# Patient Record
Sex: FEMALE | Race: WHITE | Employment: OTHER | URBAN - METROPOLITAN AREA
[De-identification: names, ages, dates, MRNs, and addresses within clinical notes are randomized per-mention and may not be internally consistent; named-entity substitution may affect disease eponyms.]

---

## 2017-11-27 ENCOUNTER — HOSPITAL ENCOUNTER (OUTPATIENT)
Dept: MAMMOGRAPHY | Age: 62
Discharge: HOME | End: 2017-11-27
Attending: OBSTETRICS & GYNECOLOGY
Payer: COMMERCIAL

## 2017-11-27 DIAGNOSIS — Z12.31 ENCOUNTER FOR SCREENING MAMMOGRAM FOR MALIGNANT NEOPLASM OF BREAST: ICD-10-CM

## 2017-11-27 PROCEDURE — 77063 BREAST TOMOSYNTHESIS BI: CPT | Performed by: OBSTETRICS & GYNECOLOGY

## 2017-11-27 PROCEDURE — 77067 SCR MAMMO BI INCL CAD: CPT | Performed by: OBSTETRICS & GYNECOLOGY

## 2018-08-24 ENCOUNTER — HOSPITAL ENCOUNTER (OUTPATIENT)
Dept: CV DIAGNOSTICS | Facility: HOSPITAL | Age: 63
Discharge: HOME | End: 2018-08-24
Attending: INTERNAL MEDICINE
Payer: COMMERCIAL

## 2018-08-24 ENCOUNTER — ANESTHESIA EVENT (OUTPATIENT)
Dept: SURGERY | Facility: HOSPITAL | Age: 63
End: 2018-08-24
Payer: COMMERCIAL

## 2018-08-24 DIAGNOSIS — R94.31 ABNORMAL EKG: ICD-10-CM

## 2018-08-24 PROCEDURE — 93017 CV STRESS TEST TRACING ONLY: CPT | Performed by: INTERNAL MEDICINE

## 2018-08-24 PROCEDURE — 93018 CV STRESS TEST I&R ONLY: CPT | Performed by: INTERNAL MEDICINE

## 2018-08-24 NOTE — PROGRESS NOTES
CARDIAC DIAGNOSTICS PRELIMINARY REPORT    No ST changes noted before, during, or after exercise. Rest: heart rate was 99 BPM, blood pressure was 128/82 mmHg, EKG showed NSR, LAD, LAFB, PRWP. Patient exercised for 6:01 minutes on a Francisco protocol.  Juventino Zendejas

## 2018-08-25 ENCOUNTER — ANESTHESIA (OUTPATIENT)
Dept: SURGERY | Facility: HOSPITAL | Age: 63
End: 2018-08-25
Payer: COMMERCIAL

## 2018-08-25 ENCOUNTER — HOSPITAL ENCOUNTER (OUTPATIENT)
Facility: HOSPITAL | Age: 63
Setting detail: HOSPITAL OUTPATIENT SURGERY
Discharge: HOME | End: 2018-08-25
Attending: DENTIST | Admitting: DENTIST
Payer: COMMERCIAL

## 2018-08-25 VITALS
SYSTOLIC BLOOD PRESSURE: 131 MMHG | DIASTOLIC BLOOD PRESSURE: 77 MMHG | HEIGHT: 67 IN | OXYGEN SATURATION: 96 % | WEIGHT: 260.13 LBS | RESPIRATION RATE: 17 BRPM | HEART RATE: 93 BPM | BODY MASS INDEX: 40.83 KG/M2 | TEMPERATURE: 98 F

## 2018-08-25 LAB
GLUCOSE BLD-MCNC: 116 MG/DL (ref 65–99)
GLUCOSE BLD-MCNC: 156 MG/DL (ref 65–99)
GLUCOSE BLD-MCNC: 156 MG/DL (ref 65–99)

## 2018-08-25 PROCEDURE — 0CQWXZ1 REPAIR OF UPPER TOOTH, MULTIPLE, EXTERNAL APPROACH: ICD-10-PCS | Performed by: DENTIST

## 2018-08-25 PROCEDURE — 0CDXXZ1 EXTRACTION OF LOWER TOOTH, MULTIPLE, EXTERNAL APPROACH: ICD-10-PCS | Performed by: DENTIST

## 2018-08-25 PROCEDURE — 82962 GLUCOSE BLOOD TEST: CPT

## 2018-08-25 PROCEDURE — 0CDWXZ1 EXTRACTION OF UPPER TOOTH, MULTIPLE, EXTERNAL APPROACH: ICD-10-PCS | Performed by: DENTIST

## 2018-08-25 PROCEDURE — 0CQXXZ1 REPAIR OF LOWER TOOTH, MULTIPLE, EXTERNAL APPROACH: ICD-10-PCS | Performed by: DENTIST

## 2018-08-25 DEVICE — IMPLANTABLE DEVICE: Type: IMPLANTABLE DEVICE | Site: MOUTH | Status: FUNCTIONAL

## 2018-08-25 RX ORDER — SODIUM CHLORIDE, SODIUM LACTATE, POTASSIUM CHLORIDE, CALCIUM CHLORIDE 600; 310; 30; 20 MG/100ML; MG/100ML; MG/100ML; MG/100ML
INJECTION, SOLUTION INTRAVENOUS CONTINUOUS
Status: DISCONTINUED | OUTPATIENT
Start: 2018-08-25 | End: 2018-08-25

## 2018-08-25 RX ORDER — METOCLOPRAMIDE HYDROCHLORIDE 5 MG/ML
10 INJECTION INTRAMUSCULAR; INTRAVENOUS AS NEEDED
Status: DISCONTINUED | OUTPATIENT
Start: 2018-08-25 | End: 2018-08-25

## 2018-08-25 RX ORDER — SCOLOPAMINE TRANSDERMAL SYSTEM 1 MG/1
1 PATCH, EXTENDED RELEASE TRANSDERMAL ONCE
Status: DISCONTINUED | OUTPATIENT
Start: 2018-08-25 | End: 2018-08-25

## 2018-08-25 RX ORDER — DEXTROSE MONOHYDRATE 25 G/50ML
50 INJECTION, SOLUTION INTRAVENOUS
Status: DISCONTINUED | OUTPATIENT
Start: 2018-08-25 | End: 2018-08-25

## 2018-08-25 RX ORDER — MIDAZOLAM HYDROCHLORIDE 1 MG/ML
1 INJECTION INTRAMUSCULAR; INTRAVENOUS EVERY 5 MIN PRN
Status: DISCONTINUED | OUTPATIENT
Start: 2018-08-25 | End: 2018-08-25

## 2018-08-25 RX ORDER — NALOXONE HYDROCHLORIDE 0.4 MG/ML
80 INJECTION, SOLUTION INTRAMUSCULAR; INTRAVENOUS; SUBCUTANEOUS AS NEEDED
Status: DISCONTINUED | OUTPATIENT
Start: 2018-08-25 | End: 2018-08-25

## 2018-08-25 RX ORDER — INSULIN ASPART 100 [IU]/ML
INJECTION, SOLUTION INTRAVENOUS; SUBCUTANEOUS ONCE
Status: DISCONTINUED | OUTPATIENT
Start: 2018-08-25 | End: 2018-08-25

## 2018-08-25 RX ORDER — ACETAMINOPHEN 500 MG
1000 TABLET ORAL ONCE
COMMUNITY

## 2018-08-25 RX ORDER — ACETAMINOPHEN 500 MG
1000 TABLET ORAL ONCE
Status: DISCONTINUED | OUTPATIENT
Start: 2018-08-25 | End: 2018-08-25 | Stop reason: HOSPADM

## 2018-08-25 RX ORDER — HYDROCODONE BITARTRATE AND ACETAMINOPHEN 10; 325 MG/1; MG/1
1 TABLET ORAL AS NEEDED
Status: DISCONTINUED | OUTPATIENT
Start: 2018-08-25 | End: 2018-08-25

## 2018-08-25 RX ORDER — MORPHINE SULFATE 4 MG/ML
2 INJECTION, SOLUTION INTRAMUSCULAR; INTRAVENOUS EVERY 5 MIN PRN
Status: DISCONTINUED | OUTPATIENT
Start: 2018-08-25 | End: 2018-08-25

## 2018-08-25 RX ORDER — MEPERIDINE HYDROCHLORIDE 25 MG/ML
12.5 INJECTION INTRAMUSCULAR; INTRAVENOUS; SUBCUTANEOUS AS NEEDED
Status: DISCONTINUED | OUTPATIENT
Start: 2018-08-25 | End: 2018-08-25

## 2018-08-25 RX ORDER — MAGNESIUM HYDROXIDE 1200 MG/15ML
LIQUID ORAL CONTINUOUS PRN
Status: DISCONTINUED | OUTPATIENT
Start: 2018-08-25 | End: 2018-08-25

## 2018-08-25 RX ORDER — BUPIVACAINE HYDROCHLORIDE AND EPINEPHRINE 5; 5 MG/ML; UG/ML
INJECTION, SOLUTION EPIDURAL; INTRACAUDAL; PERINEURAL AS NEEDED
Status: DISCONTINUED | OUTPATIENT
Start: 2018-08-25 | End: 2018-08-25 | Stop reason: HOSPADM

## 2018-08-25 RX ORDER — SCOLOPAMINE TRANSDERMAL SYSTEM 1 MG/1
PATCH, EXTENDED RELEASE TRANSDERMAL
Status: DISCONTINUED
Start: 2018-08-25 | End: 2018-08-25

## 2018-08-25 RX ORDER — HYDROCODONE BITARTRATE AND ACETAMINOPHEN 10; 325 MG/1; MG/1
2 TABLET ORAL AS NEEDED
Status: DISCONTINUED | OUTPATIENT
Start: 2018-08-25 | End: 2018-08-25

## 2018-08-25 RX ORDER — DEXTROSE MONOHYDRATE 25 G/50ML
50 INJECTION, SOLUTION INTRAVENOUS
Status: DISCONTINUED | OUTPATIENT
Start: 2018-08-25 | End: 2018-08-25 | Stop reason: HOSPADM

## 2018-08-25 RX ORDER — ONDANSETRON 2 MG/ML
4 INJECTION INTRAMUSCULAR; INTRAVENOUS AS NEEDED
Status: DISCONTINUED | OUTPATIENT
Start: 2018-08-25 | End: 2018-08-25

## 2018-08-25 NOTE — OPERATIVE REPORT
Teeth #1,3,4,12,13,14,29,31,32,were excised & Ridge Preservation Grafts were done to Surgical Sights #3,4,12,13,14,29,31,

## 2018-08-25 NOTE — ANESTHESIA PREPROCEDURE EVALUATION
PRE-OP EVALUATION    Patient Name: Kosta Cornea    Pre-op Diagnosis: TERMINAL SLEEP APNEA  UNRESTORABLE TEETH     Procedure(s):  EXTRACT TEETH 1, 32,12,13, 3, 14, 29, 31. BONE GRAFT 3, 13, 14, 29, 31, 32.   BONE GRAFT 12, 3, 13, 14, 29, 31, 32 WITH the mouth or throat. Disp:  Rfl:    Cholecalciferol (VITAMIN D OR) Take  by mouth. Disp:  Rfl:    LUTEIN OR by Does not apply route. Disp:  Rfl:    MetFORMIN HCl 1000 MG Oral Tab Take 1,000 mg by mouth 2 (two) times daily with meals.  Disp:  Rfl:    Moe Franklin

## 2018-08-25 NOTE — ANESTHESIA POSTPROCEDURE EVALUATION
[address] [facility] [place] Patient Status:  Hospital Outpatient Surgery   Age/Gender 58year old female MRN [id number]   [facility] SURGERY Attending Deonte Nevarez 148 Day # 0 PCP Aneudy Ni MD       Anesthesia Post

## 2018-08-25 NOTE — OPERATIVE REPORT
[facility]    PATIENT'S NAME: Dwayne Tatum   ATTENDING PHYSICIAN: Ashley De Leon D.D.S. OPERATING PHYSICIAN: Ashley De Leon D.D.S.    PATIENT ACCOUNT#:   [de-identified]    [facility]  MEDICAL RECORD #:   [id number]       DATE OF B 3 teeth were then surgically removed in the standard fashion. The surgical sites were irrigated, suctioned, debrided. Gel-Foam was placed in the tooth #32 area.   Following this, after adequate debridement of tooth numbers sites 29 and 31, ridge preservat Peroxide and saline 50% was utilized to cleanse the oral cavity. Oropharynx was suctioned. An orogastric tube was placed to evacuate the stomach contents.   A periapical occlusive dressing was then utilized over the mandibular right posterior, maxillary r

## 2018-11-28 ENCOUNTER — HOSPITAL ENCOUNTER (OUTPATIENT)
Dept: MAMMOGRAPHY | Age: 63
Discharge: HOME | End: 2018-11-28
Attending: OBSTETRICS & GYNECOLOGY
Payer: COMMERCIAL

## 2018-11-28 DIAGNOSIS — Z12.31 ENCOUNTER FOR SCREENING MAMMOGRAM FOR MALIGNANT NEOPLASM OF BREAST: ICD-10-CM

## 2018-11-28 PROCEDURE — 77067 SCR MAMMO BI INCL CAD: CPT | Performed by: OBSTETRICS & GYNECOLOGY

## 2018-11-28 PROCEDURE — 77063 BREAST TOMOSYNTHESIS BI: CPT | Performed by: OBSTETRICS & GYNECOLOGY

## 2021-05-27 ENCOUNTER — ORDER TRANSCRIPTION (OUTPATIENT)
Dept: ADMINISTRATIVE | Facility: HOSPITAL | Age: 66
End: 2021-05-27

## 2021-05-27 DIAGNOSIS — Z12.31 ENCOUNTER FOR SCREENING MAMMOGRAM FOR MALIGNANT NEOPLASM OF BREAST: Primary | ICD-10-CM

## 2021-08-07 ENCOUNTER — HOSPITAL ENCOUNTER (OUTPATIENT)
Dept: MAMMOGRAPHY | Age: 66
Discharge: HOME | End: 2021-08-07
Attending: FAMILY MEDICINE
Payer: MEDICARE

## 2021-08-07 DIAGNOSIS — Z12.31 ENCOUNTER FOR SCREENING MAMMOGRAM FOR MALIGNANT NEOPLASM OF BREAST: ICD-10-CM

## 2021-08-07 PROCEDURE — 77063 BREAST TOMOSYNTHESIS BI: CPT | Performed by: FAMILY MEDICINE

## 2021-08-07 PROCEDURE — 77067 SCR MAMMO BI INCL CAD: CPT | Performed by: FAMILY MEDICINE

## 2021-12-12 ENCOUNTER — NURSE ONLY (OUTPATIENT)
Dept: LAB | Age: 66
End: 2021-12-12
Attending: FAMILY MEDICINE
Payer: MEDICARE

## 2021-12-12 DIAGNOSIS — Z11.59 SCREENING EXAMINATION FOR POLIOMYELITIS: Primary | ICD-10-CM

## 2021-12-12 DIAGNOSIS — E56.9 VITAMIN DISEASE: ICD-10-CM

## 2021-12-12 PROCEDURE — 82306 VITAMIN D 25 HYDROXY: CPT

## 2021-12-12 PROCEDURE — 85025 COMPLETE CBC W/AUTO DIFF WBC: CPT

## 2021-12-12 PROCEDURE — 80053 COMPREHEN METABOLIC PANEL: CPT

## 2021-12-12 PROCEDURE — 83735 ASSAY OF MAGNESIUM: CPT

## 2021-12-14 ENCOUNTER — NURSE ONLY (OUTPATIENT)
Dept: LAB | Age: 66
End: 2021-12-14
Attending: FAMILY MEDICINE
Payer: MEDICARE

## 2021-12-14 DIAGNOSIS — D32.0 BENIGN NEOPLASM OF CEREBRAL MENINGES (HCC): Primary | ICD-10-CM

## 2021-12-14 PROCEDURE — 85025 COMPLETE CBC W/AUTO DIFF WBC: CPT

## 2021-12-14 PROCEDURE — 85027 COMPLETE CBC AUTOMATED: CPT

## 2021-12-14 PROCEDURE — 85007 BL SMEAR W/DIFF WBC COUNT: CPT

## 2021-12-14 PROCEDURE — 80053 COMPREHEN METABOLIC PANEL: CPT

## 2021-12-19 ENCOUNTER — NURSE ONLY (OUTPATIENT)
Dept: LAB | Age: 66
End: 2021-12-19
Attending: FAMILY MEDICINE
Payer: MEDICARE

## 2021-12-19 DIAGNOSIS — Z11.52 ENCOUNTER FOR SCREENING FOR SEVERE ACUTE RESPIRATORY SYNDROME CORONAVIRUS 2 (SARS-COV-2) INFECTION: Primary | ICD-10-CM

## 2024-02-16 ENCOUNTER — OFFICE VISIT (OUTPATIENT)
Dept: CARDIOLOGY | Age: 69
End: 2024-02-16

## 2024-02-16 ENCOUNTER — TELEPHONE (OUTPATIENT)
Dept: CARDIOLOGY | Age: 69
End: 2024-02-16

## 2024-02-16 VITALS
SYSTOLIC BLOOD PRESSURE: 138 MMHG | BODY MASS INDEX: 37.2 KG/M2 | HEART RATE: 64 BPM | WEIGHT: 237 LBS | DIASTOLIC BLOOD PRESSURE: 72 MMHG | HEIGHT: 67 IN

## 2024-02-16 DIAGNOSIS — E78.5 DYSLIPIDEMIA: Primary | ICD-10-CM

## 2024-02-16 DIAGNOSIS — D32.0 MENINGIOMA, CEREBRAL (CMD): ICD-10-CM

## 2024-02-16 DIAGNOSIS — Z01.810 PREOPERATIVE CARDIOVASCULAR EXAMINATION: ICD-10-CM

## 2024-02-16 DIAGNOSIS — I10 ESSENTIAL HYPERTENSION: ICD-10-CM

## 2024-02-16 DIAGNOSIS — E11.42 TYPE 2 DIABETES MELLITUS WITH DIABETIC POLYNEUROPATHY, WITHOUT LONG-TERM CURRENT USE OF INSULIN (CMD): ICD-10-CM

## 2024-02-16 DIAGNOSIS — G47.33 OSA (OBSTRUCTIVE SLEEP APNEA): ICD-10-CM

## 2024-02-16 RX ORDER — ROSUVASTATIN CALCIUM 20 MG/1
20 TABLET, COATED ORAL DAILY
COMMUNITY

## 2024-02-16 RX ORDER — GINSENG 100 MG
50 CAPSULE ORAL DAILY
COMMUNITY

## 2024-02-16 RX ORDER — ZOLPIDEM TARTRATE 10 MG/1
10 TABLET ORAL AT BEDTIME
COMMUNITY

## 2024-02-16 RX ORDER — LEVOTHYROXINE SODIUM 88 UG/1
88 TABLET ORAL DAILY
COMMUNITY

## 2024-02-16 RX ORDER — RAMIPRIL 10 MG/1
10 CAPSULE ORAL DAILY
COMMUNITY

## 2024-02-16 ASSESSMENT — PATIENT HEALTH QUESTIONNAIRE - PHQ9
1. LITTLE INTEREST OR PLEASURE IN DOING THINGS: NOT AT ALL
SUM OF ALL RESPONSES TO PHQ9 QUESTIONS 1 AND 2: 0
CLINICAL INTERPRETATION OF PHQ2 SCORE: NO FURTHER SCREENING NEEDED
SUM OF ALL RESPONSES TO PHQ9 QUESTIONS 1 AND 2: 0
2. FEELING DOWN, DEPRESSED OR HOPELESS: NOT AT ALL

## 2024-03-01 PROBLEM — G47.33 OSA (OBSTRUCTIVE SLEEP APNEA): Status: ACTIVE | Noted: 2018-02-21

## 2024-03-01 PROBLEM — E78.5 DYSLIPIDEMIA: Status: ACTIVE | Noted: 2017-09-21

## 2024-03-01 PROBLEM — I10 ESSENTIAL HYPERTENSION: Status: ACTIVE | Noted: 2017-09-21

## 2024-03-01 PROBLEM — E55.9 VITAMIN D DEFICIENCY: Status: ACTIVE | Noted: 2019-01-14

## 2024-03-01 PROBLEM — I25.10 MILD CORONARY ARTERY DISEASE: Status: ACTIVE | Noted: 2019-01-14

## 2024-03-01 PROBLEM — R47.01 APHASIA: Status: ACTIVE | Noted: 2021-12-09

## 2024-03-01 PROBLEM — D32.0 MENINGIOMA, CEREBRAL (CMD): Status: ACTIVE | Noted: 2024-03-01

## 2024-03-01 PROBLEM — Z01.810 PREOPERATIVE CARDIOVASCULAR EXAMINATION: Status: ACTIVE | Noted: 2024-03-01

## 2024-03-01 PROBLEM — E03.9 ACQUIRED HYPOTHYROIDISM: Status: ACTIVE | Noted: 2018-12-31

## 2024-07-15 ENCOUNTER — ORDER TRANSCRIPTION (OUTPATIENT)
Dept: ADMINISTRATIVE | Facility: HOSPITAL | Age: 69
End: 2024-07-15

## 2024-07-15 DIAGNOSIS — Z12.31 ENCOUNTER FOR SCREENING MAMMOGRAM FOR MALIGNANT NEOPLASM OF BREAST: Primary | ICD-10-CM

## 2024-07-20 ENCOUNTER — HOSPITAL ENCOUNTER (OUTPATIENT)
Dept: MAMMOGRAPHY | Age: 69
Discharge: HOME | End: 2024-07-20
Attending: FAMILY MEDICINE
Payer: MEDICARE

## 2024-07-20 DIAGNOSIS — Z12.31 ENCOUNTER FOR SCREENING MAMMOGRAM FOR MALIGNANT NEOPLASM OF BREAST: ICD-10-CM

## 2024-07-20 PROCEDURE — 77063 BREAST TOMOSYNTHESIS BI: CPT | Performed by: FAMILY MEDICINE

## 2024-07-20 PROCEDURE — 77067 SCR MAMMO BI INCL CAD: CPT | Performed by: FAMILY MEDICINE

## 2024-08-15 ENCOUNTER — HOSPITAL ENCOUNTER (OUTPATIENT)
Dept: MAMMOGRAPHY | Facility: HOSPITAL | Age: 69
Discharge: HOME | End: 2024-08-15
Attending: FAMILY MEDICINE
Payer: MEDICARE

## 2024-08-15 DIAGNOSIS — R92.2 INCONCLUSIVE MAMMOGRAM: ICD-10-CM

## 2024-08-15 PROCEDURE — 77061 BREAST TOMOSYNTHESIS UNI: CPT | Performed by: FAMILY MEDICINE

## 2024-08-15 PROCEDURE — 76642 ULTRASOUND BREAST LIMITED: CPT | Performed by: FAMILY MEDICINE

## 2024-08-15 PROCEDURE — 77065 DX MAMMO INCL CAD UNI: CPT | Performed by: FAMILY MEDICINE

## 2024-08-15 NOTE — IMAGING NOTE
Joanie Ramos is recommended for a stereotactic biopsy of the right breast by     History   Findings  Recommendation  See EMR for complete imaging report    Medications and allergies reviewed:  Current Outpatient Medications   Medication Sig Dispense Refill    acetaminophen 500 MG Oral Tab Take 1,000 mg by mouth one time.      docusate sodium 100 MG Oral Cap Take 100 mg by mouth 2 (two) times daily.      Liraglutide -Weight Management (SAXENDA) 18 MG/3ML Subcutaneous Solution Pen-injector Inject into the skin daily.        NON FORMULARY 2 (two) times daily.        Magnesium 100 MG Oral Tab Take by mouth.      Zolpidem Tartrate (AMBIEN) 5 MG Oral Tab Take 5 mg by mouth nightly.        Multiple Vitamins-Minerals (CENTRUM SILVER OR) Take  by mouth.      Multiple Vitamins-Minerals (ZINC OR) Use as directed  in the mouth or throat.      Cholecalciferol (VITAMIN D OR) Take  by mouth.      LUTEIN OR by Does not apply route.      MetFORMIN HCl 1000 MG Oral Tab Take 1,000 mg by mouth 2 (two) times daily with meals.      Ramipril 10 MG Oral Cap Take 10 mg by mouth daily.      Rosuvastatin Calcium (CRESTOR) 10 MG Oral Tab Take 20 mg by mouth every other day.       The following allergies were reported-  ErythromycinHIVES  CodeineOTHER (SEE COMMENTS)    Joanie Ramos denies the use of prescribed anticoagulants, denies known bleeding disorders and/or liver disease, denies chemotherapy    Procedure explained and questions answered.   Emotional and educational support provided. Joanie Ramos provided with written educational material.     Ms. Ramos instructed to take 1000 mg of acetaminophen on the day of the biopsy, eat a light meal, and bring or wear a sport bra.  Post biopsy care and instruction reviewed: including no lifting more than five pounds, no upper body exercise, icing of biopsy site, no submerging in water.  Joanie Ramos verbalized understanding.    MsBebo Rachel informed that the [facility] Center schedulers  would be contacting her once the biopsy order is received to schedule an appointment.

## 2024-08-28 ENCOUNTER — TELEPHONE (OUTPATIENT)
Dept: MAMMOGRAPHY | Facility: HOSPITAL | Age: 69
End: 2024-08-28

## 2024-08-28 NOTE — TELEPHONE ENCOUNTER
Follow up call to patient re: recommended breast biopsy. Patient's physician would like patient to see a breast surgeon prior to writing an order for a biopsy. Patient states that she has not yet been given the surgeon's name and is hoping to get an appt with her PCP tomorrow to discuss. Additional questions answered re: breast biopsy and patient was provided with the contact information for the [facility] breast surgeons. Patient was given this RN's contact information and will call us back with an update or any further questions.

## 2024-09-06 ENCOUNTER — OFFICE VISIT (OUTPATIENT)
Dept: SURGERY | Facility: CLINIC | Age: 69
End: 2024-09-06
Payer: MEDICARE

## 2024-09-06 VITALS
WEIGHT: 261.88 LBS | HEIGHT: 67 IN | RESPIRATION RATE: 18 BRPM | SYSTOLIC BLOOD PRESSURE: 141 MMHG | DIASTOLIC BLOOD PRESSURE: 72 MMHG | TEMPERATURE: 98 F | HEART RATE: 71 BPM | OXYGEN SATURATION: 96 % | BODY MASS INDEX: 41.1 KG/M2

## 2024-09-06 DIAGNOSIS — R92.1 BREAST CALCIFICATION, RIGHT: Primary | ICD-10-CM

## 2024-09-06 PROCEDURE — 99204 OFFICE O/P NEW MOD 45 MIN: CPT | Performed by: SURGERY

## 2024-09-06 RX ORDER — LEVOTHYROXINE SODIUM 100 UG/1
100 TABLET ORAL
COMMUNITY

## 2024-09-06 RX ORDER — SAW/PYGEUM/BETA/HERB/D3/B6/ZN 30 MG-25MG
1 CAPSULE ORAL DAILY
COMMUNITY

## 2024-09-06 NOTE — PROGRESS NOTES
Breast Surgery New Patient Consultation    This is the first visit for this 69 year old woman, referred by Dr. Abel, who presents for evaluation of right breast calcifications.    History of Present Illness:   Ms. Joanie Ramos is a 69 year old woman who presents with imaging detected right breast calcifications.  The patient denies any palpable masses, nipple discharge, skin changes or axillary symptoms.  She has no personal prior history of breast disease or biopsies.  She does not have any known family history of breast cancer.  She had a screening mammogram on 2024 and was found to have new calcifications in the right breast.  She had a diagnostic evaluation on 2024 that showed indeterminate calcifications and a 2 site biopsy was recommended but has not yet been performed.  She is here today for evaluation and recommendations for further therapy.        Past Medical History:    Hypothyroid    Osteoarthritis    Sleep apnea    CPAP    Type II or unspecified type diabetes mellitus without mention of complication, not stated as uncontrolled    Unspecified essential hypertension       Past Surgical History:   Procedure Laterality Date    Appendectomy      Colonoscopy      Repair of rectocele      mesh    Total abdom hysterectomy         Gynecological History:  Pt is a   Pt was 27 years old at time of first pregnancy.    She has cumulative breastfeeding history of 1 months.  She achieved menarche at age 11 and LMP Patient's last menstrual period was 2012.  Age of Menopause: 49  Type: natural  She denies any history of hormone replacement therapy f .  She has history of oral contraceptive use for 1 years, last in .  She denies infertility treatment to achieve pregnancy.    Medications:    No outpatient medications have been marked as taking for the 24 encounter (Appointment) with Luanne Barnett MD.       Allergies:    Allergies   Allergen Reactions    Erythromycin HIVES     Codeine OTHER (SEE COMMENTS)     Drowsy/respiratory rate/O2 levels decrease       Family History:   No family history on file.    She is not of Ashkenazi Sikh ancestry.    Social History:  History   Alcohol Use No       History   Smoking Status    Never   Smokeless Tobacco    Never   Ms. Joanie Ramos is  with 1 children. She has 2 siblings. She is currently Retired    Review of Systems:  General:   The patient denies, fever, chills, +night sweats, fatigue, generalized weakness, change in appetite or weight loss.    HEENT:     The patient denies eye irritation, cataracts, redness, glaucoma, yellowing of the eyes, change in vision, color blindness, or +wearing contacts/glasses. The patient denies hearing loss, ringing in the ears, ear drainage, +earaches, +nasal congestion, nose bleeds, +snoring, pain in mouth/throat, hoarseness, change in voice, facial trauma.    Respiratory:  The patient denies chronic cough, phlegm, hemoptysis, pleurisy/chest pain, pneumonia, asthma, wheezing, difficulty in breathing with exertion, emphysema, chronic bronchitis, shortness of breath or abnormal sound when breathing.     Cardiovascular:  There is no history of chest pain, chest pressure/discomfort, palpitations, irregular heartbeat, fainting or near-fainting, +difficulty breathing when lying flat, SOB/Coughing at night, +swelling of the legs or chest pain while walking.    Breasts:  See history of present illness    Gastrointestinal:     There is no history of difficulty or pain with swallowing, reflux symptoms, vomiting, dark or bloody stools, +constipation, yellowing of the skin, indigestion, nausea, +change in bowel habits, +diarrhea, abdominal pain or vomiting blood.     Genitourinary:  The patient denies frequent urination, +needing to get up at night to urinate, urinary hesitancy or retaining urine, painful urination, +urinary incontinence, decreased urine stream, blood in the urine or vaginal/penile  discharge.    Skin:    The patient denies +rash, itching, skin lesions, +dry skin, change in skin color or change in moles.     Hematologic/Lymphatic:  The patient denies +easily bruising or bleeding or persistent swollen glands or lymph nodes.     Musculoskeletal:  The patient denies +muscle aches/pain, +joint pain, +stiff joints, neck pain, +back pain or bone pain.    Neuropsychiatric:  There is no history of +migraines or severe headaches, seizure/epilepsy, +speech problems, coordination problems, trembling/tremors, fainting/black outs, dizziness, +memory problems, loss of sensation/numbness, +problems walking, weakness, tingling or burning in hands/feet. There is no history of abusive relationship, bipolar disorder, +sleep disturbance, +anxiety, +depression or +feeling of despair.    Endocrine:    There is no history of poor/slow wound healing, weight loss/gain, fertility or hormone problems, cold intolerance, thyroid disease.     Allergic/Immunologic:  There is no history of hives, hay fever, angioedema or anaphylaxis.    /72 (BP Location: Left arm, Patient Position: Sitting, Cuff Size: adult)   Pulse 71   Temp 98.3 °F (36.8 °C) (Temporal)   Resp 18   Ht 1.702 m (5' 7\")   Wt 118.8 kg (261 lb 14.4 oz)   LMP 12/18/2012   SpO2 96%   BMI 41.02 kg/m²     Physical Exam:  The patient is an alert, oriented, well-nourished and  well-developed woman who appears her stated age. Her speech patterns and movements are normal. Her affect is appropriate.    HEENT: The head is normocephalic. The neck is supple. The thyroid is not enlarged and is without palpable masses/nodules. There are no palpable masses. The trachea is in the midline. Conjunctiva are clear, non-icteric.    Chest: The chest expands symmetrically. The lungs are clear to auscultation.    Heart: The rhythm is regular.  There are no murmurs, rubs, gallops or thrills.    Breasts:  Her breasts are symmetrical with a cup size 44DD.  Right breast: The  skin, nipple ,and areola appear normal. There is no skin dimpling with movement of the pectoralis. There is no nipple retraction. No nipple discharge can be elicited. The parenchyma is mildly nodular. There are no dominant masses in the breast. The axillary tail is normal.  Left breast:   The skin, nipple, and areola appear normal. There is no skin dimpling with movement of the pectoralis. There is no nipple retraction. No nipple discharge can be elicited. The parenchyma is mildly nodular. There are no dominant masses in the breast. The axillary tail is normal.    Abdomen:  The abdomen is soft, flat and non tender. The liver is not enlarged. There are no palpable masses.    Lymph Nodes:  The supraclavicular, axillary and cervical regions are free of significant lymphadenopathy.    Back: There is no vertebral column tenderness.    Skin: The skin appears normal. There are no suspicious appearing rashes or lesions.    Extremities: The extremities are without deformity, cyanosis or edema.    Impression:   Ms. Joanie Ramos is a 69 year old woman presents with imaging detected right breast calcifications.    Discussion and Plan:  I had a discussion with the Patient regarding her breast exam. On exam today I found no evidence of clinical concern bilaterally.  I personally reviewed the recent imaging we discussed this at length.    We discussed the significance and implications of the calcifications.  I reviewed her prior imaging and we did discuss that he has have increased in number and suspicion since her last visit.  I agree with a 2 site stereotactic right breast biopsy and pending those results we discussed possible need for surgical intervention to remove the remaining pathology.  I will contact her with her pathology results when they are available to dictate the additional recommended course.   The risks and possible complications of the procedure were explained to the patient and her family and she understood  and agreed to the proposed plan. She was given ample opportunity for questions and those questions were answered to her satisfaction. She has been  encouraged to contact the office with any questions or concerns prior to her next appointment.     This note was created by Dragon voice recognition. Errors in content may be related to improper recognition by the system; efforts to review and correct have been done but errors may still exist. Please be advised the primary purpose of this note is for me to communicate medical care. Standard sentence structure is not always used. Medical terminology and medical abbreviations may be used. There may be grammatical, typographical, and automated fill ins that may have errors missed in proofreading.

## 2024-09-09 PROBLEM — R92.1 BREAST CALCIFICATION, RIGHT: Status: ACTIVE | Noted: 2024-09-09

## 2024-09-24 ENCOUNTER — HOSPITAL ENCOUNTER (OUTPATIENT)
Dept: MAMMOGRAPHY | Facility: HOSPITAL | Age: 69
Discharge: HOME | End: 2024-09-24
Attending: SURGERY
Payer: MEDICARE

## 2024-09-24 DIAGNOSIS — R92.1 BREAST CALCIFICATION, RIGHT: ICD-10-CM

## 2024-09-24 PROCEDURE — 88360 TUMOR IMMUNOHISTOCHEM/MANUAL: CPT | Performed by: SURGERY

## 2024-09-24 PROCEDURE — 88341 IMHCHEM/IMCYTCHM EA ADD ANTB: CPT | Performed by: SURGERY

## 2024-09-24 PROCEDURE — 88342 IMHCHEM/IMCYTCHM 1ST ANTB: CPT | Performed by: SURGERY

## 2024-09-24 PROCEDURE — 88305 TISSUE EXAM BY PATHOLOGIST: CPT | Performed by: SURGERY

## 2024-09-24 PROCEDURE — 19083 BX BREAST 1ST LESION US IMAG: CPT | Performed by: SURGERY

## 2024-09-24 NOTE — DISCHARGE INSTRUCTIONS
Discharge Instructions-Ordering Provider  Stereotactic / Ultrasound / MRI Core Biopsy       Place an ice pack on top of the biopsy site in your bra OR the folds of the Ace wrap for 10-15 minutes every hour until bedtime for your comfort and to decrease bleeding.     Keep your supportive bra OR the Ace wrap in place for 24 hours after your biopsy. This compression decreases bleeding and breast movement for your comfort. Wear a supportive bra for the next couple days for comfort (as well as for sleeping)     No bath or shower during the first 24 hours after biopsy. After this time, you may take a bath or shower. It's okay if the steri-strips get wet but don't soak them.   No saunas, hot tubs, or swimming pools until the steri-strips fall off (5-7days).  This prevents infection and allows time for the area to completely close and heal.     DO NOT remove the steri-strips. They will fall off in 5 days to two weeks. If any type of irritation (redness, itching, OR blisters) develops in the area around the steri-strips, remove them gently. Keep the area clean and dry.     It is normal to have mild discomfort and bruising at the biopsy site.      You may take Tylenol as needed for discomfort.     DO NOT participate in strenuous activity (aerobics, heavy lifting, housework, gardening, etc.) 48 hours after your biopsy to prevent bleeding.     You will receive results from your ordering provider. Please contact them with any questions.    If you have any questions about the procedure, please contact the Breast Care Coordinator Nurse at [phone]. (M-F 7:30-4)    If you are having a MRI Breast Biopsy or an Ultrasound guided biopsy, you will be billed for the biopsy and a unilateral mammogram separately.    A 6-month or one year follow-up Diagnostic Mammogram/Ultrasound will be recommended to document stability of the biopsy site. It may be scheduled at [facility] or [facility] [place] by calling [phone].  You will need an order for this exam from your referring physician.       5/2024

## 2024-09-26 ENCOUNTER — TELEPHONE (OUTPATIENT)
Dept: HEMATOLOGY/ONCOLOGY | Facility: HOSPITAL | Age: 69
End: 2024-09-26

## 2024-09-26 ENCOUNTER — TELEPHONE (OUTPATIENT)
Dept: SURGERY | Facility: CLINIC | Age: 69
End: 2024-09-26

## 2024-09-26 NOTE — TELEPHONE ENCOUNTER
LM for patient regarding pathology findings. Patient has diagnosis of DCIS that will require wire localized lumpectomy. Patient was instructed to call the office back to help with coordination.

## 2024-09-26 NOTE — TELEPHONE ENCOUNTER
Called patient and introduced myself as one of the breast nurse navigators and the role in her care. Verified her full name and  and provided her breast biopsy results of DCIS. I read Dr. Barnett's note from her pathology report to the patient. Answered her questions to the best of my ability. I asked her if she wanted a clinic appointment to discuss the next steps in her care as mentioned by Dr. Barnett and the patient stated she would prefer an appointment to discuss surgical options. She stated she has no family history of breast cancer. She thanked me for the phone call and assistance. Provided the breast RN navigators contact information for further assistance.

## 2024-09-27 ENCOUNTER — TELEPHONE (OUTPATIENT)
Dept: HEMATOLOGY/ONCOLOGY | Facility: HOSPITAL | Age: 69
End: 2024-09-27

## 2024-09-27 NOTE — TELEPHONE ENCOUNTER
Patient called back and I offered her an appointment with Dr. Barnett on Friday October 4, 2024 at 0730 in [place]. Patient accepted the appointment. I updated her family history and her medical history she provided that her sister at the age of 23. Answered patient's questions to the best of my ability. She thanked me for the phone call and assistance.

## 2024-09-27 NOTE — TELEPHONE ENCOUNTER
Left voicemail for patient to call back, calling to offer appointment with Dr. Barnett on 10/4 to discuss surgical options.

## 2024-10-04 ENCOUNTER — OFFICE VISIT (OUTPATIENT)
Dept: SURGERY | Facility: CLINIC | Age: 69
End: 2024-10-04
Payer: MEDICARE

## 2024-10-04 VITALS
TEMPERATURE: 99 F | SYSTOLIC BLOOD PRESSURE: 137 MMHG | HEART RATE: 73 BPM | DIASTOLIC BLOOD PRESSURE: 65 MMHG | OXYGEN SATURATION: 96 % | RESPIRATION RATE: 18 BRPM | BODY MASS INDEX: 42 KG/M2 | WEIGHT: 265 LBS

## 2024-10-04 DIAGNOSIS — R92.1 BREAST CALCIFICATION, RIGHT: ICD-10-CM

## 2024-10-04 DIAGNOSIS — D05.11 DUCTAL CARCINOMA IN SITU (DCIS) OF RIGHT BREAST: Primary | ICD-10-CM

## 2024-10-04 PROCEDURE — 99215 OFFICE O/P EST HI 40 MIN: CPT | Performed by: SURGERY

## 2024-10-04 NOTE — PATIENT INSTRUCTIONS
Dr. 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 Biopsy confirms ductal carcinoma in situ,   _______________________________________________________________________________    Someone must accompany you the day of the procedure to drive you home safely, because of anesthesia.  You will need an adult  to stay with you the first night following your surgery.  You must remove any kind of makeup, acrylic nails, lotions, powders, creams or deodorant.  [facility] ONLY: Pre-admission will give instruct you on when to take Gatorade and Tylenol/acetaminophen prior to your surgery, purchase 2 - 12oz bottles of regular Gatorade (NOT RED/SUGAR FREE). Otherwise, you may not eat or drink anything else after 11PM the night before surgery.    [facility] ONLY: You may not eat or drink anything after midnight the day of your surgery.   Wear comfortable clothing that can be easily removed.  If you wear dentures, contacts lenses, or any prosthesis, you will be asked to remove them.  Do not drink alcohol or smoke 24 hours prior to your procedure.  Bring a picture ID and your insurance card.  Covid-19 testing is no longer required before surgery unless you are experiencing symptoms such as fever, cough, congestion, etc.   The Pre-Admission Testing Department will call the day before to confirm your procedure, give you the time you need to arrive by and directions on where to go. They begin making calls after 2pm, if you are not contacted by 4pm, please call the surgeon's office listed above.  Do not take  any blood thinners at least one week prior to the procedure/surgery. This includes aspirin, baby aspirin, Ibuprofen products, herbal supplements, diet medications, vitamin E, fish oil and green tea supplements. Please check other supplements for these ingredients. *TYLENOL or acetaminophen is acceptable*  If you take Coumadin, Plavix, Xarelto, or Eliquis, please contact your prescribing physician for special instructions on how long to hold. If you take insulin contact your primary care physician for special instructions.  Our surgery scheduler, Na, will be contacting you to discuss surgery dates. If you have any questions related to scheduling your surgery, please reach out to her at [phone].  _____________________________________________________________________  PRE-OPERATIVE TESTING IF INDICATED BELOW  PLEASE COMPLETE ASAP (AT LEAST 14-21 DAYS PRIOR TO SURGERY)  [] CBC [x] BMP [] CMP [x] EKG    [] PT, PTT, INR [] Cardiac Clearance  [x] H&P Medical Clearance [] Chest X-ray     Please call Central Scheduling to schedule an appointment for pre-operative labs/tests @ [phone]  Does the patient have a pacemaker or ICD?           Does the patient have sleep apnea?  [] Yes   [x] No                               [x] Yes ( patient uses CPAP machine)   [] No  [id number]

## 2024-10-08 ENCOUNTER — TELEPHONE (OUTPATIENT)
Dept: SURGERY | Facility: CLINIC | Age: 69
End: 2024-10-08

## 2024-10-08 ENCOUNTER — TELEPHONE (OUTPATIENT)
Dept: MAMMOGRAPHY | Facility: HOSPITAL | Age: 69
End: 2024-10-08

## 2024-10-08 DIAGNOSIS — D05.11 DUCTAL CARCINOMA IN SITU OF RIGHT BREAST: Primary | ICD-10-CM

## 2024-10-08 NOTE — TELEPHONE ENCOUNTER
Spoke with Joanie Ramos regarding needle localization process of breast for lumpectomy scheduled for 10-18-24 with Dr. Barnett. Procedure explained and all questions answered. Pt to be transported via W/C through [facility] to [facility] in [place]. Pt verbalized understanding and had no further questions at this time.

## 2024-10-08 NOTE — TELEPHONE ENCOUNTER
Calling pt in regards to scheduling surgery.  Informed pt that I have 10/18/2024 available at [facility] with Dr. Barnett.  Pt verbalized understanding and in agreement with date and location.  All questions answered.   Encouraged pt to call or [employer] message office with any other questions or concerns.

## 2024-10-09 RX ORDER — SODIUM CHLORIDE, SODIUM LACTATE, POTASSIUM CHLORIDE, CALCIUM CHLORIDE 600; 310; 30; 20 MG/100ML; MG/100ML; MG/100ML; MG/100ML
INJECTION, SOLUTION INTRAVENOUS CONTINUOUS
OUTPATIENT
Start: 2024-10-09

## 2024-10-10 ENCOUNTER — EKG ENCOUNTER (OUTPATIENT)
Dept: LAB | Facility: HOSPITAL | Age: 69
End: 2024-10-10
Attending: FAMILY MEDICINE
Payer: MEDICARE

## 2024-10-10 DIAGNOSIS — E11.9 DIABETES MELLITUS (HCC): ICD-10-CM

## 2024-10-10 DIAGNOSIS — I10 ESSENTIAL HYPERTENSION, MALIGNANT: Primary | ICD-10-CM

## 2024-10-10 DIAGNOSIS — C50.911 MALIGNANT NEOPLASM OF RIGHT BREAST (HCC): ICD-10-CM

## 2024-10-12 LAB
ATRIAL RATE: 63 BPM
P AXIS: 85 DEGREES
P-R INTERVAL: 132 MS
Q-T INTERVAL: 388 MS
QRS DURATION: 86 MS
QTC CALCULATION (BEZET): 397 MS
R AXIS: -44 DEGREES
T AXIS: 42 DEGREES
VENTRICULAR RATE: 63 BPM

## 2024-10-18 ENCOUNTER — HOSPITAL ENCOUNTER (OUTPATIENT)
Facility: HOSPITAL | Age: 69
Setting detail: HOSPITAL OUTPATIENT SURGERY
Discharge: HOME | End: 2024-10-18
Attending: SURGERY | Admitting: SURGERY
Payer: MEDICARE

## 2024-10-18 ENCOUNTER — ANESTHESIA EVENT (OUTPATIENT)
Dept: SURGERY | Facility: HOSPITAL | Age: 69
End: 2024-10-18
Payer: MEDICARE

## 2024-10-18 ENCOUNTER — ANESTHESIA (OUTPATIENT)
Dept: SURGERY | Facility: HOSPITAL | Age: 69
End: 2024-10-18
Payer: MEDICARE

## 2024-10-18 ENCOUNTER — HOSPITAL ENCOUNTER (OUTPATIENT)
Dept: MAMMOGRAPHY | Facility: HOSPITAL | Age: 69
Discharge: HOME | End: 2024-10-18
Attending: SURGERY | Admitting: SURGERY
Payer: MEDICARE

## 2024-10-18 VITALS
WEIGHT: 267.38 LBS | HEART RATE: 79 BPM | DIASTOLIC BLOOD PRESSURE: 64 MMHG | SYSTOLIC BLOOD PRESSURE: 155 MMHG | HEIGHT: 67 IN | OXYGEN SATURATION: 96 % | TEMPERATURE: 98 F | BODY MASS INDEX: 41.97 KG/M2 | RESPIRATION RATE: 16 BRPM

## 2024-10-18 DIAGNOSIS — E11.9 DIABETES MELLITUS (HCC): ICD-10-CM

## 2024-10-18 DIAGNOSIS — D05.11 DUCTAL CARCINOMA IN SITU OF RIGHT BREAST: ICD-10-CM

## 2024-10-18 DIAGNOSIS — I10 ESSENTIAL HYPERTENSION, MALIGNANT: ICD-10-CM

## 2024-10-18 DIAGNOSIS — D05.11 DUCTAL CARCINOMA IN SITU (DCIS) OF RIGHT BREAST: Primary | ICD-10-CM

## 2024-10-18 DIAGNOSIS — C50.911 MALIGNANT NEOPLASM OF RIGHT BREAST (HCC): ICD-10-CM

## 2024-10-18 LAB
GLUCOSE BLD-MCNC: 122 MG/DL (ref 70–99)
GLUCOSE BLD-MCNC: 201 MG/DL (ref 70–99)

## 2024-10-18 PROCEDURE — 88305 TISSUE EXAM BY PATHOLOGIST: CPT | Performed by: SURGERY

## 2024-10-18 PROCEDURE — 19281 PERQ DEVICE BREAST 1ST IMAG: CPT | Performed by: SURGERY

## 2024-10-18 PROCEDURE — 0HBT0ZZ EXCISION OF RIGHT BREAST, OPEN APPROACH: ICD-10-PCS | Performed by: SURGERY

## 2024-10-18 PROCEDURE — 88360 TUMOR IMMUNOHISTOCHEM/MANUAL: CPT | Performed by: SURGERY

## 2024-10-18 PROCEDURE — 82962 GLUCOSE BLOOD TEST: CPT

## 2024-10-18 PROCEDURE — 88307 TISSUE EXAM BY PATHOLOGIST: CPT | Performed by: SURGERY

## 2024-10-18 PROCEDURE — 19282 PERQ DEVICE BREAST EA IMAG: CPT | Performed by: SURGERY

## 2024-10-18 PROCEDURE — 88342 IMHCHEM/IMCYTCHM 1ST ANTB: CPT | Performed by: SURGERY

## 2024-10-18 PROCEDURE — 76098 X-RAY EXAM SURGICAL SPECIMEN: CPT | Performed by: SURGERY

## 2024-10-18 PROCEDURE — 88344 IMHCHEM/IMCYTCHM EA MLT ANTB: CPT | Performed by: SURGERY

## 2024-10-18 PROCEDURE — 88341 IMHCHEM/IMCYTCHM EA ADD ANTB: CPT | Performed by: SURGERY

## 2024-10-18 RX ORDER — LIDOCAINE HYDROCHLORIDE 10 MG/ML
INJECTION, SOLUTION EPIDURAL; INFILTRATION; INTRACAUDAL; PERINEURAL AS NEEDED
Status: DISCONTINUED | OUTPATIENT
Start: 2024-10-18 | End: 2024-10-18 | Stop reason: SURG

## 2024-10-18 RX ORDER — DIAZEPAM 5 MG/1
5 TABLET ORAL EVERY 30 MIN PRN
Status: DISCONTINUED | OUTPATIENT
Start: 2024-10-18 | End: 2024-10-18 | Stop reason: HOSPADM

## 2024-10-18 RX ORDER — ALBUTEROL SULFATE 0.83 MG/ML
2.5 SOLUTION RESPIRATORY (INHALATION) AS NEEDED
Status: DISCONTINUED | OUTPATIENT
Start: 2024-10-18 | End: 2024-10-18

## 2024-10-18 RX ORDER — HYDROCODONE BITARTRATE AND ACETAMINOPHEN 5; 325 MG/1; MG/1
2 TABLET ORAL ONCE AS NEEDED
Status: DISCONTINUED | OUTPATIENT
Start: 2024-10-18 | End: 2024-10-18

## 2024-10-18 RX ORDER — INSULIN ASPART 100 [IU]/ML
INJECTION, SOLUTION INTRAVENOUS; SUBCUTANEOUS ONCE
Status: DISCONTINUED | OUTPATIENT
Start: 2024-10-18 | End: 2024-10-18

## 2024-10-18 RX ORDER — SODIUM CHLORIDE, SODIUM LACTATE, POTASSIUM CHLORIDE, CALCIUM CHLORIDE 600; 310; 30; 20 MG/100ML; MG/100ML; MG/100ML; MG/100ML
INJECTION, SOLUTION INTRAVENOUS CONTINUOUS
Status: DISCONTINUED | OUTPATIENT
Start: 2024-10-18 | End: 2024-10-18

## 2024-10-18 RX ORDER — ONDANSETRON 2 MG/ML
4 INJECTION INTRAMUSCULAR; INTRAVENOUS EVERY 6 HOURS PRN
Status: DISCONTINUED | OUTPATIENT
Start: 2024-10-18 | End: 2024-10-18

## 2024-10-18 RX ORDER — NICOTINE POLACRILEX 4 MG
15 LOZENGE BUCCAL
Status: DISCONTINUED | OUTPATIENT
Start: 2024-10-18 | End: 2024-10-18 | Stop reason: HOSPADM

## 2024-10-18 RX ORDER — DIPHENHYDRAMINE HYDROCHLORIDE 50 MG/ML
12.5 INJECTION INTRAMUSCULAR; INTRAVENOUS AS NEEDED
Status: DISCONTINUED | OUTPATIENT
Start: 2024-10-18 | End: 2024-10-18

## 2024-10-18 RX ORDER — HYDROCODONE BITARTRATE AND ACETAMINOPHEN 5; 325 MG/1; MG/1
1-2 TABLET ORAL EVERY 6 HOURS PRN
Qty: 20 TABLET | Refills: 0 | Status: SHIPPED | OUTPATIENT
Start: 2024-10-18 | End: 2024-10-24 | Stop reason: ALTCHOICE

## 2024-10-18 RX ORDER — SODIUM CHLORIDE 9 MG/ML
INJECTION, SOLUTION INTRAVENOUS CONTINUOUS
Status: DISCONTINUED | OUTPATIENT
Start: 2024-10-18 | End: 2024-10-18

## 2024-10-18 RX ORDER — HYDROMORPHONE HYDROCHLORIDE 1 MG/ML
0.6 INJECTION, SOLUTION INTRAMUSCULAR; INTRAVENOUS; SUBCUTANEOUS EVERY 5 MIN PRN
Status: DISCONTINUED | OUTPATIENT
Start: 2024-10-18 | End: 2024-10-18

## 2024-10-18 RX ORDER — HYDROMORPHONE HYDROCHLORIDE 1 MG/ML
0.2 INJECTION, SOLUTION INTRAMUSCULAR; INTRAVENOUS; SUBCUTANEOUS EVERY 5 MIN PRN
Status: DISCONTINUED | OUTPATIENT
Start: 2024-10-18 | End: 2024-10-18

## 2024-10-18 RX ORDER — BUPIVACAINE HYDROCHLORIDE 5 MG/ML
INJECTION, SOLUTION EPIDURAL; INTRACAUDAL AS NEEDED
Status: DISCONTINUED | OUTPATIENT
Start: 2024-10-18 | End: 2024-10-18 | Stop reason: HOSPADM

## 2024-10-18 RX ORDER — DEXTROSE MONOHYDRATE 25 G/50ML
50 INJECTION, SOLUTION INTRAVENOUS
Status: DISCONTINUED | OUTPATIENT
Start: 2024-10-18 | End: 2024-10-18 | Stop reason: HOSPADM

## 2024-10-18 RX ORDER — NICOTINE POLACRILEX 4 MG
30 LOZENGE BUCCAL
Status: DISCONTINUED | OUTPATIENT
Start: 2024-10-18 | End: 2024-10-18 | Stop reason: HOSPADM

## 2024-10-18 RX ORDER — METOCLOPRAMIDE HYDROCHLORIDE 5 MG/ML
10 INJECTION INTRAMUSCULAR; INTRAVENOUS EVERY 8 HOURS PRN
Status: DISCONTINUED | OUTPATIENT
Start: 2024-10-18 | End: 2024-10-18

## 2024-10-18 RX ORDER — HYDROCODONE BITARTRATE AND ACETAMINOPHEN 5; 325 MG/1; MG/1
1 TABLET ORAL ONCE AS NEEDED
Status: DISCONTINUED | OUTPATIENT
Start: 2024-10-18 | End: 2024-10-18

## 2024-10-18 RX ORDER — ACETAMINOPHEN 500 MG
1000 TABLET ORAL ONCE AS NEEDED
Status: DISCONTINUED | OUTPATIENT
Start: 2024-10-18 | End: 2024-10-18

## 2024-10-18 RX ORDER — HYDRALAZINE HYDROCHLORIDE 20 MG/ML
5 INJECTION INTRAMUSCULAR; INTRAVENOUS EVERY 10 MIN PRN
Status: DISCONTINUED | OUTPATIENT
Start: 2024-10-18 | End: 2024-10-18

## 2024-10-18 RX ORDER — LIDOCAINE HYDROCHLORIDE AND EPINEPHRINE 10; 10 MG/ML; UG/ML
INJECTION, SOLUTION INFILTRATION; PERINEURAL AS NEEDED
Status: DISCONTINUED | OUTPATIENT
Start: 2024-10-18 | End: 2024-10-18 | Stop reason: HOSPADM

## 2024-10-18 RX ORDER — ACETAMINOPHEN 500 MG
1000 TABLET ORAL ONCE
Status: DISCONTINUED | OUTPATIENT
Start: 2024-10-18 | End: 2024-10-18 | Stop reason: HOSPADM

## 2024-10-18 RX ORDER — HYDROMORPHONE HYDROCHLORIDE 1 MG/ML
0.4 INJECTION, SOLUTION INTRAMUSCULAR; INTRAVENOUS; SUBCUTANEOUS EVERY 5 MIN PRN
Status: DISCONTINUED | OUTPATIENT
Start: 2024-10-18 | End: 2024-10-18

## 2024-10-18 RX ORDER — NALOXONE HYDROCHLORIDE 0.4 MG/ML
0.08 INJECTION, SOLUTION INTRAMUSCULAR; INTRAVENOUS; SUBCUTANEOUS AS NEEDED
Status: DISCONTINUED | OUTPATIENT
Start: 2024-10-18 | End: 2024-10-18

## 2024-10-18 RX ORDER — SODIUM CHLORIDE 9 MG/ML
INJECTION, SOLUTION INTRAVENOUS CONTINUOUS PRN
Status: DISCONTINUED | OUTPATIENT
Start: 2024-10-18 | End: 2024-10-18 | Stop reason: SURG

## 2024-10-18 RX ORDER — CEFAZOLIN SODIUM IN 0.9 % NACL 3 G/100 ML
3 INTRAVENOUS SOLUTION, PIGGYBACK (ML) INTRAVENOUS ONCE
Status: COMPLETED | OUTPATIENT
Start: 2024-10-18 | End: 2024-10-18

## 2024-10-18 RX ORDER — METOCLOPRAMIDE HYDROCHLORIDE 5 MG/ML
INJECTION INTRAMUSCULAR; INTRAVENOUS AS NEEDED
Status: DISCONTINUED | OUTPATIENT
Start: 2024-10-18 | End: 2024-10-18 | Stop reason: SURG

## 2024-10-18 RX ORDER — ONDANSETRON 2 MG/ML
INJECTION INTRAMUSCULAR; INTRAVENOUS AS NEEDED
Status: DISCONTINUED | OUTPATIENT
Start: 2024-10-18 | End: 2024-10-18 | Stop reason: SURG

## 2024-10-18 RX ADMIN — LIDOCAINE HYDROCHLORIDE 50 MG: 10 INJECTION, SOLUTION EPIDURAL; INFILTRATION; INTRACAUDAL; PERINEURAL at 14:53:00

## 2024-10-18 RX ADMIN — SODIUM CHLORIDE: 9 INJECTION, SOLUTION INTRAVENOUS at 14:46:00

## 2024-10-18 RX ADMIN — ONDANSETRON 4 MG: 2 INJECTION INTRAMUSCULAR; INTRAVENOUS at 15:03:00

## 2024-10-18 RX ADMIN — METOCLOPRAMIDE HYDROCHLORIDE 10 MG: 5 INJECTION INTRAMUSCULAR; INTRAVENOUS at 15:01:00

## 2024-10-18 RX ADMIN — CEFAZOLIN SODIUM IN 0.9 % NACL 3 G: 3 G/100 ML INTRAVENOUS SOLUTION, PIGGYBACK (ML) INTRAVENOUS at 15:01:00

## 2024-10-18 NOTE — ANESTHESIA PREPROCEDURE EVALUATION
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  Temp: 98.2 °F (36.8 °C)  Pulse: 75  Resp: 16  BP: 154/70  SpO2: 96 %  Body mass index is 41.88 kg/m².    Current medications reviewed.  Hospital Medications:   diazePAM (Valium) tab 5 mg  5 mg Oral Q30 Min PRN    acetaminophen (Tylenol Extra Strength) tab 1,000 mg  1,000 mg Oral Once    glucose (Dex4) 15 GM/59ML oral liquid 15 g  15 g Oral Q15 Min PRN    Or    glucose (Glutose) 40% oral gel 15 g  15 g Oral Q15 Min PRN    Or    glucose-vitamin C (Dex-4) chewable tab 4 tablet  4 tablet Oral Q15 Min PRN    Or    dextrose 50% injection 50 mL  50 mL Intravenous Q15 Min PRN    Or    glucose (Dex4) 15 GM/59ML oral liquid 30 g  30 g Oral Q15 Min PRN    Or    glucose (Glutose) 40% oral gel 30 g  30 g Oral Q15 Min PRN    Or    glucose-vitamin C (Dex-4) chewable tab 8 tablet  8 tablet Oral Q15 Min PRN    ceFAZolin (Ancef) 3 g in sodium chloride 0.9% 100mL IVPB premix  3 g Intravenous Once       Outpatient Medications:   Prescriptions Prior to Admission[1]    Allergies: Bactrim [sulfamethoxazole w/trimethoprim], Erythromycin, and Codeine      Anesthesia Evaluation    Patient summary reviewed.    Anesthetic Complications           GI/Hepatic/Renal    Negative GI/hepatic/renal ROS.                             Cardiovascular                (+) obesity  (+) hypertension                                     Endo/Other      (+) diabetes                            Pulmonary                    (+) sleep apnea       Neuro/Psych    Negative neuro/psych ROS.                                  Past Surgical History:   Procedure Laterality Date     Appendectomy      Brain surgery Left 12/07/2021    Removal of a tumor    Colonoscopy      Hernia surgery  04/2019    Navel/umbilical hernia    Other surgical history      D and C 2x after miscarriages 1980 and 1992.    Repair of rectocele      mesh    Total abdom hysterectomy       Social History     Socioeconomic History    Marital status:    Tobacco Use    Smoking status: Never    Smokeless tobacco: Never   Vaping Use    Vaping status: Never Used   Substance and Sexual Activity    Alcohol use: No    Drug use: No     History   Drug Use No     Available pre-op labs reviewed.               Airway      Mallampati: II       Cardiovascular    Cardiovascular exam normal.         Dental    Dentition appears grossly intact         Pulmonary    Pulmonary exam normal.                 Other findings              ASA: 3   Plan: general  NPO status verified and           Plan/risks discussed with: patient                Present on Admission:  **None**             [1]   Medications Prior to Admission   Medication Sig Dispense Refill Last Dose/Taking    PATIENT SUPPLIED MEDICATION [facility]: women's 50 + multivitamin/multi minerals supplement 1 tab every morning    Huperzine A 200 mcg one capsule at dinner time    Super B Complex with Vit C one tablet at dinner time    Zinc Gluconate 50 mg  one tablet at dinner time   10/8/2024    MAGNESIUM CITRATE OR Take 250 mg by mouth at bedtime.   10/8/2024    Alpha Lipoic Acid 200 MG Oral Cap Take 1 tablet by mouth. About dinner time   10/8/2024    empagliflozin (JARDIANCE) 10 MG Oral Tab Take 1 tablet (10 mg total) by mouth daily.   10/13/2024    levothyroxine 100 MCG Oral Tab Take 88 mcg by mouth. Takes every 3-4 am daily   10/16/2024    acetaminophen 500 MG Oral Tab Take 2 tablets (1,000 mg total) by mouth as needed.   Taking As Needed    docusate sodium 100 MG Oral Cap Take 1 capsule (100 mg total) by mouth every morning.   10/8/2024    Multiple Vitamins-Minerals (ZINC  OR) Use as directed in the mouth or throat daily.   10/8/2024    Cholecalciferol (VITAMIN D OR) Take 25 mg by mouth daily. Vit D3   10/8/2024    LUTEIN OR every morning.   10/8/2024    Ramipril 10 MG Oral Cap Take 1 capsule (10 mg total) by mouth every morning.   10/16/2024    Rosuvastatin Calcium (CRESTOR) 10 MG Oral Tab Take 1.5 tablets (15 mg total) by mouth daily.   10/16/2024

## 2024-10-18 NOTE — H&P
History of Present Illness:   Ms. Joanie Ramos is a 69 year old woman who presents with imaging detected right breast calcifications.  The patient denies any palpable masses, nipple discharge, skin changes or axillary symptoms.  She has no personal prior history of breast disease or biopsies.  She does not have any known family history of breast cancer.  She had a screening mammogram on 2024 and was found to have new calcifications in the right breast.  She had a diagnostic evaluation on 2024 that showed indeterminate calcifications and a 2 site biopsy was recommended but has not yet been performed.  She is here today for evaluation and recommendations for further therapy.        Past Medical History       Past Medical History:    Hypothyroid    Osteoarthritis    Sleep apnea     CPAP    Type II or unspecified type diabetes mellitus without mention of complication, not stated as uncontrolled    Unspecified essential hypertension            Past Surgical History         Past Surgical History:   Procedure Laterality Date    Appendectomy        Colonoscopy        Repair of rectocele         mesh    Total abdom hysterectomy                Gynecological History:  Pt is a   Pt was 27 years old at time of first pregnancy.    She has cumulative breastfeeding history of 1 months.  She achieved menarche at age 11 and LMP Patient's last menstrual period was 2012.  Age of Menopause: 49  Type: natural  She denies any history of hormone replacement therapy f .  She has history of oral contraceptive use for 1 years, last in .  She denies infertility treatment to achieve pregnancy.     Medications:    Medications Ordered Today   No outpatient medications have been marked as taking for the 24 encounter (Appointment) with Luanne Barnett MD.            Allergies:    Allergies         Allergies   Allergen Reactions    Erythromycin HIVES    Codeine OTHER (SEE COMMENTS)       Drowsy/respiratory  rate/O2 levels decrease            Family History:   Family History   No family history on file.        She is not of Ashkenazi Alev[place] ancestry.     Social History:      History   Alcohol Use No             History   Smoking Status    Never   Smokeless Tobacco    Never   Ms. Joanie Ramos is  with 1 children. She has 2 siblings. She is currently Retired     Review of Systems:  General:   The patient denies, fever, chills, +night sweats, fatigue, generalized weakness, change in appetite or weight loss.     HEENT:     The patient denies eye irritation, cataracts, redness, glaucoma, yellowing of the eyes, change in vision, color blindness, or +wearing contacts/glasses. The patient denies hearing loss, ringing in the ears, ear drainage, +earaches, +nasal congestion, nose bleeds, +snoring, pain in mouth/throat, hoarseness, change in voice, facial trauma.     Respiratory:  The patient denies chronic cough, phlegm, hemoptysis, pleurisy/chest pain, pneumonia, asthma, wheezing, difficulty in breathing with exertion, emphysema, chronic bronchitis, shortness of breath or abnormal sound when breathing.      Cardiovascular:  There is no history of chest pain, chest pressure/discomfort, palpitations, irregular heartbeat, fainting or near-fainting, +difficulty breathing when lying flat, SOB/Coughing at night, +swelling of the legs or chest pain while walking.     Breasts:  See history of present illness     Gastrointestinal:     There is no history of difficulty or pain with swallowing, reflux symptoms, vomiting, dark or bloody stools, +constipation, yellowing of the skin, indigestion, nausea, +change in bowel habits, +diarrhea, abdominal pain or vomiting blood.      Genitourinary:  The patient denies frequent urination, +needing to get up at night to urinate, urinary hesitancy or retaining urine, painful urination, +urinary incontinence, decreased urine stream, blood in the urine or vaginal/penile discharge.     Skin:     The patient denies +rash, itching, skin lesions, +dry skin, change in skin color or change in moles.      Hematologic/Lymphatic:  The patient denies +easily bruising or bleeding or persistent swollen glands or lymph nodes.      Musculoskeletal:  The patient denies +muscle aches/pain, +joint pain, +stiff joints, neck pain, +back pain or bone pain.     Neuropsychiatric:  There is no history of +migraines or severe headaches, seizure/epilepsy, +speech problems, coordination problems, trembling/tremors, fainting/black outs, dizziness, +memory problems, loss of sensation/numbness, +problems walking, weakness, tingling or burning in hands/feet. There is no history of abusive relationship, bipolar disorder, +sleep disturbance, +anxiety, +depression or +feeling of despair.     Endocrine:    There is no history of poor/slow wound healing, weight loss/gain, fertility or hormone problems, cold intolerance, thyroid disease.      Allergic/Immunologic:  There is no history of hives, hay fever, angioedema or anaphylaxis.     /72 (BP Location: Left arm, Patient Position: Sitting, Cuff Size: adult)   Pulse 71   Temp 98.3 °F (36.8 °C) (Temporal)   Resp 18   Ht 1.702 m (5' 7\")   Wt 118.8 kg (261 lb 14.4 oz)   LMP 12/18/2012   SpO2 96%   BMI 41.02 kg/m²      Physical Exam:  The patient is an alert, oriented, well-nourished and  well-developed woman who appears her stated age. Her speech patterns and movements are normal. Her affect is appropriate.     HEENT: The head is normocephalic. The neck is supple. The thyroid is not enlarged and is without palpable masses/nodules. There are no palpable masses. The trachea is in the midline. Conjunctiva are clear, non-icteric.     Chest: The chest expands symmetrically. The lungs are clear to auscultation.     Heart: The rhythm is regular.  There are no murmurs, rubs, gallops or thrills.     Breasts:  Her breasts are symmetrical with a cup size 44DD.  Right breast: The skin,  nipple ,and areola appear normal. There is no skin dimpling with movement of the pectoralis. There is no nipple retraction. No nipple discharge can be elicited. The parenchyma is mildly nodular. There are no dominant masses in the breast. The axillary tail is normal.  Left breast:   The skin, nipple, and areola appear normal. There is no skin dimpling with movement of the pectoralis. There is no nipple retraction. No nipple discharge can be elicited. The parenchyma is mildly nodular. There are no dominant masses in the breast. The axillary tail is normal.     Abdomen:  The abdomen is soft, flat and non tender. The liver is not enlarged. There are no palpable masses.     Lymph Nodes:  The supraclavicular, axillary and cervical regions are free of significant lymphadenopathy.     Back: There is no vertebral column tenderness.     Skin: The skin appears normal. There are no suspicious appearing rashes or lesions.     Extremities: The extremities are without deformity, cyanosis or edema.     Impression:   Ms. Joanie Ramos is a 69 year old woman presents with imaging detected right breast calcifications.     Discussion and Plan:  I had a discussion with the Patient regarding her breast exam. On exam today I found no evidence of clinical concern bilaterally.  I personally reviewed the recent imaging we discussed this at length.     We discussed the significance and implications of the calcifications.  I reviewed her prior imaging and we did discuss that he has have increased in number and suspicion since her last visit.  I agree with a 2 site stereotactic right breast biopsy and pending those results we discussed possible need for surgical intervention to remove the remaining pathology.  I will contact her with her pathology results when they are available to dictate the additional recommended course.   The risks and possible complications of the procedure were explained to the patient and her family and she understood  and agreed to the proposed plan. She was given ample opportunity for questions and those questions were answered to her satisfaction. She has been  encouraged to contact the office with any questions or concerns prior to her next appointment.      This note was created by [employer] voice recognition. Errors in content may be related to improper recognition by the system; efforts to review and correct have been done but errors may still exist. Please be advised the primary purpose of this note is for me to communicate medical care. Standard sentence structure is not always used. Medical terminology and medical abbreviations may be used. There may be grammatical, typographical, and automated fill ins that may have errors missed in proofreading.    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  I discussed with the patient and/or legal representative the potential benefits, risks and side effects of this procedure; the likelihood of the patient achieving goals; and potential problems that might occur during recuperation.  I discussed reasonable alternatives to the procedure, including risks, benefits and side effects related to the alternatives and risks related to not receiving this procedure.  We will proceed with procedure as planned.

## 2024-10-18 NOTE — ANESTHESIA PROCEDURE NOTES
Airway  Date/Time: 10/18/2024 2:56 PM  Urgency: elective    Airway not difficult    General Information and Staff    Patient location during procedure: OR  Anesthesiologist: Gene Greene MD  Resident/CRNA: Keila Araujo CRNA  Performed: CRNA   Performed by: Keila Araujo CRNA  Authorized by: Gene Greene MD      Indications and Patient Condition  Indications for airway management: anesthesia  Spontaneous Ventilation: absent  Sedation level: deep  Preoxygenated: yes  Patient position: sniffing  Mask difficulty assessment: 1 - vent by mask    Final Airway Details  Final airway type: supraglottic airway      Successful airway: classic  Size 4       Number of attempts at approach: 1

## 2024-10-18 NOTE — PROGRESS NOTES
Breast Surgery Surveillance    History of Present Illness:   Ms. Joanie Ramos is a 69 year old woman who presents with imaging detected right breast calcifications.  The patient denies any palpable masses, nipple discharge, skin changes or axillary symptoms.  She has no personal prior history of breast disease or biopsies.  She does not have any known family history of breast cancer.  She had a screening mammogram on 2024 and was found to have new calcifications in the right breast.  She had a diagnostic evaluation on 2024 that showed indeterminate calcifications and a 2 site biopsy was recommended.  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  She is here today for evaluation and recommendations for further therapy.        Past Medical History:    Cancer (HCC)    right breast    Disorder of thyroid    Exposure to medical diagnostic radiation    High blood pressure    High cholesterol    Hypothyroid    Osteoarthritis    right knee    Personal history of antineoplastic chemotherapy    Sleep apnea    CPAP    Type II or unspecified type diabetes mellitus without mention of complication, not stated as uncontrolled    Unspecified essential hypertension    Visual impairment    glasses       Past Surgical History:   Procedure Laterality Date    Appendectomy      Brain surgery Left 2021    Removal of a tumor    Colonoscopy      Hernia surgery  2019    Navel/umbilical hernia    Other surgical history      D and C 2x after miscarriages  and .    Repair of rectocele      mesh    Total abdom hysterectomy         Gynecological History:  Pt is a   Pt was 27 years old at time of first pregnancy.    She has cumulative breastfeeding history of 1 months.  She achieved menarche at age 11 and LMP Patient's last menstrual period was 2012.  Age of Menopause: 49  Type: natural  She denies any history of hormone  replacement therapy f .  She has history of oral contraceptive use for 1 years, last in 1982.  She denies infertility treatment to achieve pregnancy.    Medications:     Alpha Lipoic Acid 200 MG Oral Cap Take 1 tablet by mouth. About dinner time      empagliflozin (JARDIANCE) 10 MG Oral Tab Take 1 tablet (10 mg total) by mouth daily.      levothyroxine 100 MCG Oral Tab Take 88 mcg by mouth. Takes every 3-4 am daily      acetaminophen 500 MG Oral Tab Take 2 tablets (1,000 mg total) by mouth as needed.      docusate sodium 100 MG Oral Cap Take 1 capsule (100 mg total) by mouth every morning.      [DISCONTINUED] NON FORMULARY 2 (two) times daily.        [DISCONTINUED] Zolpidem Tartrate (AMBIEN) 5 MG Oral Tab Take 1 tablet (5 mg total) by mouth nightly.      [DISCONTINUED] Multiple Vitamins-Minerals (CENTRUM SILVER OR) Take by mouth daily.      Multiple Vitamins-Minerals (ZINC OR) Use as directed in the mouth or throat daily.      Cholecalciferol (VITAMIN D OR) Take 25 mg by mouth daily. Vit D3      LUTEIN OR every morning.      [DISCONTINUED] MetFORMIN HCl 1000 MG Oral Tab Take 1 tablet (1,000 mg total) by mouth 2 (two) times daily with meals.      Ramipril 10 MG Oral Cap Take 1 capsule (10 mg total) by mouth every morning.      Rosuvastatin Calcium (CRESTOR) 10 MG Oral Tab Take 1.5 tablets (15 mg total) by mouth daily.         Allergies:    Allergies   Allergen Reactions    Bactrim [Sulfamethoxazole W/Trimethoprim] OTHER (SEE COMMENTS)     General hives and swelling of throat    Erythromycin HIVES    Codeine OTHER (SEE COMMENTS)     Drowsy/respiratory rate/O2 levels decrease       Family History:   Family History   Problem Relation Age of Onset    Uterine Cancer Sister 20 - 29       She is not of Ashkenazi Denominational ancestry.    Social History:  History   Alcohol Use No       History   Smoking Status    Never   Smokeless Tobacco    Never   Ms. Joanie Ramos is  with 1 children. She has 2 siblings. She is  currently Retired    Review of Systems:  General:   The patient denies, fever, chills, +night sweats, fatigue, generalized weakness, change in appetite or weight loss.    HEENT:     The patient denies eye irritation, cataracts, redness, glaucoma, yellowing of the eyes, change in vision, color blindness, or +wearing contacts/glasses. The patient denies hearing loss, ringing in the ears, ear drainage, +earaches, +nasal congestion, nose bleeds, +snoring, pain in mouth/throat, hoarseness, change in voice, facial trauma.    Respiratory:  The patient denies chronic cough, phlegm, hemoptysis, pleurisy/chest pain, pneumonia, asthma, wheezing, difficulty in breathing with exertion, emphysema, chronic bronchitis, shortness of breath or abnormal sound when breathing.     Cardiovascular:  There is no history of chest pain, chest pressure/discomfort, palpitations, irregular heartbeat, fainting or near-fainting, +difficulty breathing when lying flat, SOB/Coughing at night, +swelling of the legs or chest pain while walking.    Breasts:  See history of present illness    Gastrointestinal:     There is no history of difficulty or pain with swallowing, reflux symptoms, vomiting, dark or bloody stools, +constipation, yellowing of the skin, indigestion, nausea, +change in bowel habits, +diarrhea, abdominal pain or vomiting blood.     Genitourinary:  The patient denies frequent urination, +needing to get up at night to urinate, urinary hesitancy or retaining urine, painful urination, +urinary incontinence, decreased urine stream, blood in the urine or vaginal/penile discharge.    Skin:    The patient denies +rash, itching, skin lesions, +dry skin, change in skin color or change in moles.     Hematologic/Lymphatic:  The patient denies +easily bruising or bleeding or persistent swollen glands or lymph nodes.     Musculoskeletal:  The patient denies +muscle aches/pain, +joint pain, +stiff joints, neck pain, +back pain or bone  pain.    Neuropsychiatric:  There is no history of +migraines or severe headaches, seizure/epilepsy, +speech problems, coordination problems, trembling/tremors, fainting/black outs, dizziness, +memory problems, loss of sensation/numbness, +problems walking, weakness, tingling or burning in hands/feet. There is no history of abusive relationship, bipolar disorder, +sleep disturbance, +anxiety, +depression or +feeling of despair.    Endocrine:    There is no history of poor/slow wound healing, weight loss/gain, fertility or hormone problems, cold intolerance, thyroid disease.     Allergic/Immunologic:  There is no history of hives, hay fever, angioedema or anaphylaxis.    /65 (BP Location: Right arm, Patient Position: Sitting, Cuff Size: adult)   Pulse 73   Temp 98.7 °F (37.1 °C) (Temporal)   Resp 18   Wt 120.2 kg (265 lb)   LMP 12/18/2012   SpO2 96%   BMI 41.50 kg/m²     Physical Exam:  The patient is an alert, oriented, well-nourished and  well-developed woman who appears her stated age. Her speech patterns and movements are normal. Her affect is appropriate.    HEENT: The head is normocephalic. The neck is supple. The thyroid is not enlarged and is without palpable masses/nodules. There are no palpable masses. The trachea is in the midline. Conjunctiva are clear, non-icteric.    Chest: The chest expands symmetrically. The lungs are clear to auscultation.    Heart: The rhythm is regular.  There are no murmurs, rubs, gallops or thrills.    Breasts:  Her breasts are symmetrical with a cup size 44DD.  Right breast: The skin, nipple ,and areola appear normal. There is no skin dimpling with movement of the pectoralis. There is no nipple retraction. No nipple discharge can be elicited. The parenchyma is mildly nodular. There are no dominant masses in the breast. The axillary tail is normal.  Left breast:   The skin, nipple, and areola appear normal. There is no skin dimpling with movement of the pectoralis.  There is no nipple retraction. No nipple discharge can be elicited. The parenchyma is mildly nodular. There are no dominant masses in the breast. The axillary tail is normal.    Abdomen:  The abdomen is soft, flat and non tender. The liver is not enlarged. There are no palpable masses.    Lymph Nodes:  The supraclavicular, axillary and cervical regions are free of significant lymphadenopathy.    Back: There is no vertebral column tenderness.    Skin: The skin appears normal. There are no suspicious appearing rashes or lesions.    Extremities: The extremities are without deformity, cyanosis or edema.    Impression:   Ms. Joanie Ramos is a 69 year old woman presents with imaging detected right breast calcifications and biopsy confirmed right breast DCIS, clinical stage Tis NxMx.    Discussion and Plan:  I had a discussion with the Patient regarding her breast exam. On exam today I found her to be healing well since recent biopsy with no other clinical findings.  I personally reviewed the recent imaging and pathology and we discussed this at length.    The natural history and evolution of DCIS was discussed with Ms. Joanie Ramos and her family. This  included the difference between in-situ and invasive carcinoma, and the distinction between  local and systemic disease and local and systemic therapy. For local treatment options, I  explained the risks and benefits of breast conservation and mastectomy (with or without  reconstruction), including the fact that survival rates are essentially equal with these two  approaches. If breast conservation is elected, I explained the need for free margins, the  possibility of re-excision to achieve free margins, and the need for post-operative radiotherapy.  The patient was told that saulo staging is not usually required for DCIS, but is sometimes  recommended depending on the size and grade of the lesion, and if mastectomy is planned  for treatment. The reasons for this were  explained. I explained that for pure DCIS, systemic  therapy is not required, although endocrine agents such as tamoxifen can be used in women  with hormone sensitive disease in order to reduce the risk of local recurrence and future new  primaries  Following this discussion, where all of the patient's questions were answered, we agreed to  proceed with right breast wire localized lumpectomy. The risks and possible complications of the procedure were explained to the patient and her family and she understood and agreed to the proposed plan. She was given ample opportunity for questions and those questions were answered to her satisfaction. She has been  encouraged to contact the office with any questions or concerns prior to her next appointment.     This note was created by Dragon voice recognition. Errors in content may be related to improper recognition by the system; efforts to review and correct have been done but errors may still exist. Please be advised the primary purpose of this note is for me to communicate medical care. Standard sentence structure is not always used. Medical terminology and medical abbreviations may be used. There may be grammatical, typographical, and automated fill ins that may have errors missed in proofreading.

## 2024-10-18 NOTE — IMAGING NOTE
Assisted Dr.Nimesh Coyne with mammography guided needle localization of the right breast-two wires placed   Joanie Ramos identified with spelling of name and date of birth.   Medications and allergies reviewed. The following allergies were reported-  Bactrim [Sulfamethoxazole W/trimethoprim]OTHER (SEE COMMENTS)  ErythromycinHIVES  CodeineOTHER (SEE COMMENTS)    History: right breast-Ductal carcinoma in situ   Surgery: Right breast wire localized lumpectomy     Order verified.  Procedure explained and questions answered. Joanie Ramos verbalized understanding and agreement.  Written educational material provided  1326: Written consent obtained.     1329: Scans taken by [facility]- [place] technologist    1335: Site prepped in a sterile manner.    1336: Dr. Viral Coyne  present    1337: Time out complete.     1337: Lidocaine administered for anesthetic affect.  1338: Julien 20G x 7.5cm needle placed- Right breast, 8 o'clock position, 2 cm from the nipple, vision shaped clip. Biopsy confirms ductal carcinoma in situ,   1339: Lidocaine administered for anesthetic affect.  1340: Julien 20G x 7.5cm needle placed- Right breast, 8 o'clock position, 2 cm from the nipple, vision shaped clip. Biopsy confirms ductal carcinoma in situ,     Emotional support provided.  Joanie Ramos tolerated procedure well.     Site cleaned.  Wires secured with: blue clips, steri strips, sterile 4x4 gauze dressing, Transpore tape, paper tape, and Tegaderm.     Joanie Ramos transported via wheelchair to pre-op/surgery holding in stable condition. Ms. Ramos without complaints or concerns at this time.

## 2024-10-18 NOTE — ANESTHESIA POSTPROCEDURE EVALUATION
[facility]    Joanie Ramos Patient Status:  Hospital Outpatient Surgery   Age/Gender 69 year old female MRN [id number]   Location [facility] SURGERY Attending Luanne Barnett MD   Hosp Day # 0 PCP Rui Abel DO       Anesthesia Post-op Note    Right breast wire localized lumpectomy    Procedure Summary       Date: 10/18/24 Room / Location:  MAIN OR 12 /  MAIN OR    Anesthesia Start: 1446 Anesthesia Stop: 1535    Procedure: Right breast wire localized lumpectomy (Right) Diagnosis:       Ductal carcinoma in situ of right breast      (Ductal carcinoma in situ of right breast [D05.11])    Surgeons: Luanne Barnett MD Anesthesiologist: Gene Greene MD    Anesthesia Type: general ASA Status: 3            Anesthesia Type: general    Vitals Value Taken Time   /77 10/18/24 1537   Temp 97.7 °F (36.5 °C) 10/18/24 1537   Pulse 81 10/18/24 1537   Resp 14 10/18/24 1537   SpO2 95 % 10/18/24 1537       Patient Location: PACU    Anesthesia Type: general    Airway Patency: patent    Postop Pain Control: adequate    Mental Status: mildly sedated but able to meaningfully participate in the post-anesthesia evaluation    Nausea/Vomiting: none    Cardiopulmonary/Hydration status: stable euvolemic    Complications: no apparent anesthesia related complications    Postop vital signs: stable    Comments: Pt breathing comfortably, sleepy but arousable, VSS. Report to RN.     Dental Exam: Unchanged from Preop    Patient to be discharged from PACU when criteria met.

## 2024-10-18 NOTE — DISCHARGE INSTRUCTIONS
Breast Surgery  Post-operative Instructions  Lumpectomy  Luanne Barnett MD  General Instructions  The following instructions will provide helpful information that will assist your recovery. These are designed to be general guidelines. Please remember that everyone heals and recovers differently. Listen to your body and rest when you are tired. If you have any questions or concerns, please do not hesitate to contact my office. I would like to see you in the office about one week after surgery, please schedule and appointment through my office to make a post-operative appointment if you do not already have one.     Restrictions  There are no lifting weight restrictions for the arm on the surgical side. You may gradually increase the amount of weight based on your comfort level. You should avoid a lot of repetitious activity with the arm until the wound is well-healed (about two weeks).   You should not drive a car until you believe you can react to an emergency situation and you’re no longer taking narcotic pain medications.   You may shower the day after surgery. You should not bathe or swim (i.e. submerge wound) until the wound is well healed (about two weeks).  There are no dietary restrictions.    Exercise  You may begin arm exercises within a couple days. Do these 2 or 3 times per day, beginning with light exercise and gradually increase your range of motion and repetitions. This will help your arm regain full mobility. We will address your activity level again at your post-operative visit.   You will have pain medication prescribed before discharge. Take this as directed to relieve pain. It is important that you be comfortable so that you may continue your stretching exercises.   If you find the medication prescribed is too strong, try Tylenol (Acetaminophen) or Ibuprofen.    Wound Care  You may remove the gauze dressing on the first or second postoperative day and then shower. You should leave the  steri-strips in place; they will start to peel off about 10 days after your surgery. The stitches are all underneath the skin and will dissolve on their own. You will not need any stitches removed except if you have a drain in place.  I encourage you to shower once the outer bandage is removed, you may use soap and water directly over the steri-strips and pat dry following.  You should keep gauze dressing on the wound until the wound is completely dry and without drainage-usually 1-3 days.   If a surgical bra was placed after the surgery, I encourage you to wear it as much as possible during the week following the procedure (including during sleep). Alternatively, you may choose to wear your own bra provided it is comfortable, provides support and does not have an underwire. If the breast doesn’t move it is less painful.  If an elastic bandage was placed around your chest after the surgery you may remove it on the 1st or 2nd day after surgery. If you prefer to leave it on longer, you may.  It is normal to feel a lump in the area of the incisions for up to 6 months. This is part of the healing process. Eventually the breast will return to its normal condition.   Pain Medication  You will be given a prescription for a narcotic pain medication (usually Norco) upon discharge. Many patients have very little pain and don’t want to use the narcotic. Don’t be afraid to use it if you’re uncomfortable. If you’d prefer you may substitute Tylenol or Ibuprofen (Motrin, Advil). Using an ice pack for a few minutes over the incision can also alleviate pain. If you do use the narcotic medication, use an over the counter stool softener or gentle laxative and stay well-hydrated as constipation is not uncommon with narcotics.    Pathology Report  The Pathology report is usually available 4-5 business days following the surgery. I will call you  with the results once the report is available.    Notify my office if:   Your temperature is  over 101.5 F   You notice increasing swelling, redness, warmth or drainage from around the incision or drain site.    If you experience any problems please call my office and either my nurse or myself will respond. After hours, you will be forwarded to my answering service which will help you get in touch with myself or the physician covering for me.

## 2024-10-18 NOTE — BRIEF OP NOTE
Pre-Operative Diagnosis: Ductal carcinoma in situ of right breast [D05.11]     Post-Operative Diagnosis: Ductal carcinoma in situ of right breast [D05.11]      Procedure Performed:   Right breast wire localized lumpectomy    Surgeons and Role:     * Luanne Barnett MD - Primary    Assistant(s):  Surgical Assistant.: Zehra Schaeffer CSA     Surgical Findings: Calcifications and clip visualized on specimen radiogram     Specimen: Right breast bracketed lumpectomy, right breast margins x 6     Estimated Blood Loss: 5cc    Luanne Barnett MD  10/18/2024  2:37 PM

## 2024-10-19 NOTE — OPERATIVE REPORT
[facility]    PATIENT'S NAME: JOSY BRANNON   ATTENDING PHYSICIAN: Luanne Barnett M.D.   OPERATING PHYSICIAN: Luanne Barnett M.D.   PATIENT ACCOUNT#:   [id number]    LOCATION:  [facility] 10  MEDICAL RECORD #:   [id number]       YOB: 1955  ADMISSION DATE:       10/18/2024      OPERATION DATE:  10/18/2024    OPERATIVE REPORT    PREOPERATIVE DIAGNOSIS:  Ductal carcinoma in situ of the right breast.  POSTOPERATIVE DIAGNOSIS:  Ductal carcinoma in situ of the right breast.  PROCEDURE:  Right breast wire-bracketed lumpectomy with right breast specimen radiography and right breast advancement flap mastopexy for defect measuring 12 sq cm.    ASSISTANT:  Zehra Schaeffer CSA    ESTIMATED BLOOD LOSS:  5 mL.    DRAINS:  None.    COMPLICATIONS:  None.    DISPOSITION:  Stable on transfer to recovery room.    INDICATIONS:  The patient is a 69-year-old female found to have new calcifications on mammogram that underwent biopsy and was confirmed to be DCIS.  We discussed local treatment options.  She is motivated for a breast-conserving approach.   We discussed the need to achieve free margins, the possibility of re-excision to achieve free margins as well as possible need for postoperative further systemic and/or radiation risk reduction.  Risks and possible complications including, but not limited to, infection, bleeding, injury to surrounding structures, possible need for reoperation were discussed with the patient.  She agreed to proceed.    OPERATIVE TECHNIQUE:  Patient was brought to the imaging suite.  She underwent a wire bracketing of the area of concern.  There was an anterior group of second calcifications seen on the right breast on imaging.  She was then brought to OR, placed in supine position, properly padded and secured, given a dose of IV antibiotics, and sequential compression devices were applied to legs for DVT prophylaxis.  General anesthesia induced.  The right  breast was prepped and draped in usual sterile fashion.  Then, 1% lidocaine with epinephrine was used to infiltrate the skin and subcutaneous tissues at the targeted incision site.  A curvilinear incision was made along the lateral areolar border with a 15 blade knife in the skin.  Both wires were brought into the field and a segment of breast tissue surrounding the tip of both wires was excised en bloc in bracketed fashion oriented with a short stitch single clip superiorly and long stitch double clip laterally in order to allow for appropriate pathological margin assessment and review.  It was then placed in the imaging device where specimen x-ray confirmed the presence of both targeted clips with calcifications and clip with adequate margins as deemed by myself.  The wound was then inspected, shave margins were taken along the periphery of the cavity in 6 locations, each individually labeled and marked with a clip at true margin, and sent for permanent pathologic evaluation.  Wound was irrigated.  Hemostasis assured with electrocautery.  Hemoclips were placed around the periphery of the cavity to assist with subsequent surveillance.  She was found to have a large defect in the lower outer quadrant measuring at least 12 sq cm thought to impair both optimal wound healing and cosmesis for which we proceeded with an advancement flap mastopexy.  This required that I place a counterincision through the immediate adjacent medial superior breast parenchyma down to the level of pectoralis fascia.  I then used the superior vascular pedicle, mobilized this into the aforementioned defect and secured this at the level of pectoralis fascia with a running 3-0 PDS suture.  The wound was then closed with interrupted 3-0 Vicryl for deep layer and a running 4-0 subcuticular Monocryl for skin.  Mastisol and Steri-Strips applied.  Then, 0.5% Marcaine was instilled in the cavity to assist with postoperative analgesia.  Sterile  dressing and compression bra were placed.  Blood loss was minimal.  All counts were correct at the conclusion of procedure.  She tolerated the procedure well.  She was transferred to Recovery in stable condition.    Dictated By Luanne Barnett M.D.  d: 10/18/2024 15:31:53  t: 10/19/2024 00:50:30  Job [id number]  CMG/    cc: Dr. Rui Abel

## 2024-10-21 ENCOUNTER — TELEPHONE (OUTPATIENT)
Dept: HEMATOLOGY/ONCOLOGY | Facility: HOSPITAL | Age: 69
End: 2024-10-21

## 2024-10-21 NOTE — TELEPHONE ENCOUNTER
Calling POD#3, left message for patient to call back. Calling to check in on patient and to schedule with medical oncology.

## 2024-10-22 ENCOUNTER — TELEPHONE (OUTPATIENT)
Dept: RADIATION ONCOLOGY | Facility: HOSPITAL | Age: 69
End: 2024-10-22

## 2024-10-22 ENCOUNTER — TELEPHONE (OUTPATIENT)
Dept: HEMATOLOGY/ONCOLOGY | Facility: HOSPITAL | Age: 69
End: 2024-10-22

## 2024-10-22 NOTE — TELEPHONE ENCOUNTER
Called patient with an update from today's tumor board discussion.  She is being referred to radiation therapy and will get a phone call from a  for this consultation.

## 2024-10-24 ENCOUNTER — NURSE ONLY (OUTPATIENT)
Dept: SURGERY | Facility: CLINIC | Age: 69
End: 2024-10-24
Payer: MEDICARE

## 2024-10-24 NOTE — PROGRESS NOTES
Patient presents today for a post-op nurse visit for surgical wound check.  Pt had a Right breast wire localized lumpectomy on 10/18/24.  Pt has been healing well since surgery.  Pain has been tolerable and managed with pain medication.  Pt denies any signs or symptoms of infection, including fever, chills, redness at surgical site, increase swelling, surrounding skin that is hot to touch, and abnormal drainage from the site.  Steri strips are still intact and in place.   Informed pt to keep these in place until next post-op visit.  Slight bruising present on right  breast.  No problems at the surgical site.  Surrounding skin is c/d/i.  Informed pt when she should call the office regarding concerns with the surgical incision site.  Pathology results were shared with pt by Dr. Barnett via [employer]/phone call.  Pt is aware that further details regarding pathology results will be discussed with Dr. Barnett at the next post-op appt.  Informed pt that her next follow-up will be with Dr. Barnett on 10/29/24.  Pt verbalized understanding.  All questions were answered.  Encouraged to call or [employer] message with any other questions or concerns.

## 2024-10-29 ENCOUNTER — OFFICE VISIT (OUTPATIENT)
Dept: SURGERY | Facility: CLINIC | Age: 69
End: 2024-10-29
Payer: MEDICARE

## 2024-10-29 VITALS
HEART RATE: 72 BPM | TEMPERATURE: 99 F | WEIGHT: 268.63 LBS | DIASTOLIC BLOOD PRESSURE: 69 MMHG | SYSTOLIC BLOOD PRESSURE: 127 MMHG | BODY MASS INDEX: 42 KG/M2 | RESPIRATION RATE: 18 BRPM | OXYGEN SATURATION: 94 %

## 2024-10-29 DIAGNOSIS — D05.11 BREAST NEOPLASM, TIS (DCIS), RIGHT: Primary | ICD-10-CM

## 2024-10-29 PROCEDURE — 99024 POSTOP FOLLOW-UP VISIT: CPT | Performed by: SURGERY

## 2024-10-31 PROBLEM — D05.11 BREAST NEOPLASM, TIS (DCIS), RIGHT: Status: ACTIVE | Noted: 2024-10-31

## 2024-11-01 NOTE — PROGRESS NOTES
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    Stage: Tis NxMx    Disease Status:  Surgical treatment complete, medical and radiation oncology recommendations pending.    History:   This 69 year old woman presented with imaging detected right breast calcifications.  The patient denies any palpable masses, nipple discharge, skin changes or axillary symptoms.  She has no personal prior history of breast disease or biopsies.  She does not have any known family history of breast cancer.  She had a screening mammogram on 2024 and was found to have new calcifications in the right breast.  She had a diagnostic evaluation on 2024 that showed indeterminate calcifications and a 2 site biopsy was recommended.  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  She underwent breast conserving surgery without complication.  She is here today for evaluation and recommendations for further therapy.        Past Medical History:    Cancer (HCC)    right breast    Disorder of thyroid    Exposure to medical diagnostic radiation    High blood pressure    High cholesterol    Hypothyroid    Osteoarthritis    right knee    Personal history of antineoplastic chemotherapy    Sleep apnea    CPAP    Type II or unspecified type diabetes mellitus without mention of complication, not stated as uncontrolled    Unspecified essential hypertension    Visual impairment    glasses       Past Surgical History:   Procedure Laterality Date    Appendectomy      Brain surgery Left 2021    Removal of a tumor    Colonoscopy      Hernia surgery  2019    Navel/umbilical hernia    Other surgical history      D and C 2x after miscarriages  and .    Repair of rectocele      mesh    Total abdom hysterectomy         Gynecological History:  Pt is a   Pt  was 27 years old at time of first pregnancy.    She has cumulative breastfeeding history of 1 months.  She achieved menarche at age 11 and LMP Patient's last menstrual period was 12/18/2012.  Age of Menopause: 49  Type: natural  She denies any history of hormone replacement therapy f .  She has history of oral contraceptive use for 1 years, last in 1982.  She denies infertility treatment to achieve pregnancy.    Medications:     PATIENT SUPPLIED MEDICATION [facility]: women's 50 + multivitamin/multi minerals supplement 1 tab every morning    Huperzine A 200 mcg one capsule at dinner time    Super B Complex with Vit C one tablet at dinner time    Zinc Gluconate 50 mg  one tablet at dinner time      MAGNESIUM CITRATE OR Take 250 mg by mouth at bedtime.      Alpha Lipoic Acid 200 MG Oral Cap Take 1 tablet by mouth. About dinner time      empagliflozin (JARDIANCE) 10 MG Oral Tab Take 1 tablet (10 mg total) by mouth daily.      levothyroxine 100 MCG Oral Tab Take 88 mcg by mouth. Takes every 3-4 am daily      acetaminophen 500 MG Oral Tab Take 2 tablets (1,000 mg total) by mouth as needed.      docusate sodium 100 MG Oral Cap Take 1 capsule (100 mg total) by mouth every morning.      Multiple Vitamins-Minerals (ZINC OR) Use as directed in the mouth or throat daily.      Cholecalciferol (VITAMIN D OR) Take 25 mg by mouth daily. Vit D3      LUTEIN OR every morning.      Ramipril 10 MG Oral Cap Take 1 capsule (10 mg total) by mouth every morning.      Rosuvastatin Calcium (CRESTOR) 10 MG Oral Tab Take 1.5 tablets (15 mg total) by mouth daily.         Allergies:    Allergies   Allergen Reactions    Bactrim [Sulfamethoxazole W/Trimethoprim] OTHER (SEE COMMENTS)     General hives and swelling of throat    Erythromycin HIVES    Codeine OTHER (SEE COMMENTS)     Drowsy/respiratory rate/O2 levels decrease       Family History:   Family History   Problem Relation Age of Onset    Uterine Cancer Sister 20 - 29       She is  not of Ashkenazi Evangelical ancestry.    Social History:  History   Alcohol Use No       History   Smoking Status    Never   Smokeless Tobacco    Never   Ms. Joanie Ramos is  with 1 children. She has 2 siblings. She is currently Retired    Review of Systems:  General:   The patient denies, fever, chills, +night sweats, fatigue, generalized weakness, change in appetite or weight loss.    HEENT:     The patient denies eye irritation, cataracts, redness, glaucoma, yellowing of the eyes, change in vision, color blindness, or +wearing contacts/glasses. The patient denies hearing loss, ringing in the ears, ear drainage, +earaches, +nasal congestion, nose bleeds, +snoring, pain in mouth/throat, hoarseness, change in voice, facial trauma.    Respiratory:  The patient denies chronic cough, phlegm, hemoptysis, pleurisy/chest pain, pneumonia, asthma, wheezing, difficulty in breathing with exertion, emphysema, chronic bronchitis, shortness of breath or abnormal sound when breathing.     Cardiovascular:  There is no history of chest pain, chest pressure/discomfort, palpitations, irregular heartbeat, fainting or near-fainting, +difficulty breathing when lying flat, SOB/Coughing at night, +swelling of the legs or chest pain while walking.    Breasts:  See history of present illness    Gastrointestinal:     There is no history of difficulty or pain with swallowing, reflux symptoms, vomiting, dark or bloody stools, +constipation, yellowing of the skin, indigestion, nausea, +change in bowel habits, +diarrhea, abdominal pain or vomiting blood.     Genitourinary:  The patient denies frequent urination, +needing to get up at night to urinate, urinary hesitancy or retaining urine, painful urination, +urinary incontinence, decreased urine stream, blood in the urine or vaginal/penile discharge.    Skin:    The patient denies +rash, itching, skin lesions, +dry skin, change in skin color or change in moles.      Hematologic/Lymphatic:  The patient denies +easily bruising or bleeding or persistent swollen glands or lymph nodes.     Musculoskeletal:  The patient denies +muscle aches/pain, +joint pain, +stiff joints, neck pain, +back pain or bone pain.    Neuropsychiatric:  There is no history of +migraines or severe headaches, seizure/epilepsy, +speech problems, coordination problems, trembling/tremors, fainting/black outs, dizziness, +memory problems, loss of sensation/numbness, +problems walking, weakness, tingling or burning in hands/feet. There is no history of abusive relationship, bipolar disorder, +sleep disturbance, +anxiety, +depression or +feeling of despair.    Endocrine:    There is no history of poor/slow wound healing, weight loss/gain, fertility or hormone problems, cold intolerance, thyroid disease.     Allergic/Immunologic:  There is no history of hives, hay fever, angioedema or anaphylaxis.    /69 (BP Location: Right arm, Patient Position: Sitting, Cuff Size: large)   Pulse 72   Temp 98.7 °F (37.1 °C) (Temporal)   Resp 18   Wt 121.8 kg (268 lb 9.6 oz)   LMP 12/18/2012   SpO2 94%   BMI 42.07 kg/m²     Physical Exam:  The patient is an alert, oriented, well-nourished and  well-developed woman who appears her stated age. Her speech patterns and movements are normal. Her affect is appropriate.    HEENT: The head is normocephalic. The neck is supple. The thyroid is not enlarged and is without palpable masses/nodules. There are no palpable masses. The trachea is in the midline. Conjunctiva are clear, non-icteric.    Chest: The chest expands symmetrically. The lungs are clear to auscultation.    Heart: The rhythm is regular.  There are no murmurs, rubs, gallops or thrills.    Breasts:  Her breasts are symmetrical with a cup size 44DD.  Right breast: The skin, nipple ,and areola appear normal. There is no skin dimpling with movement of the pectoralis. There is no nipple retraction. No nipple  discharge can be elicited. The parenchyma is mildly nodular. There are no dominant masses in the breast. The axillary tail is normal.  There is a well-healed incision with no signs of infection.  Left breast:   The skin, nipple, and areola appear normal. There is no skin dimpling with movement of the pectoralis. There is no nipple retraction. No nipple discharge can be elicited. The parenchyma is mildly nodular. There are no dominant masses in the breast. The axillary tail is normal.    Abdomen:  The abdomen is soft, flat and non tender. The liver is not enlarged. There are no palpable masses.    Lymph Nodes:  The supraclavicular, axillary and cervical regions are free of significant lymphadenopathy.    Back: There is no vertebral column tenderness.    Skin: The skin appears normal. There are no suspicious appearing rashes or lesions.    Extremities: The extremities are without deformity, cyanosis or edema.    Impression:   Ms. Joanie Ramos is a 69 year old woman presents with imaging detected right breast calcifications and biopsy confirmed right breast DCIS status postlumpectomy.    Discussion and Plan:  I had a discussion with the Patient regarding her breast exam. On exam today I found her to be healing well since surgery with no signs of infection.  I personally reviewed the pathology did confirm a solid papillary carcinoma measuring 2.5 mm associated with DCIS.  Her final surgical margins are clear.  No further surgery is recommended at this time.  I have recommended she meet with both medical and radiation oncology for further treatment recommendations.  Will plan for ongoing surveillance with a bilateral diagnostic evaluation in May 2025 with a clinical exam to follow. She was encouraged to contact the office with any questions or concerns prior to her next scheduled appointment.

## 2024-11-04 NOTE — CONSULTS
Cancer Center Report of Consultation    Patient Name: Joanie Ramos   YOB: 1955   Medical Record Number: [id number]   CSN: [id number]   Consulting Physician: Reagan Ricks MD  Referring Physician(s): Rui Abel DO    Date of Consultation: 2024       Reason for Consultation:  Joanie Ramos was seen today in the Cancer Center for the diagnosis of R breast DCIS.    History of Present Illness:     69 year old that presents with new diagnosis of right breast DCIS.  Screening mammogram -new right breast calcifications.  Diagnostic mammogram -indeterminate calcifications.  US guided right 8:00 biopsy -grade 3 DCIS.  Associated ALH.  R lumpectomy 10/24.  Path-3.5 mm DCIS, grade 3, 100% ER, 60% [place].    Past Medical History:  Past Medical History:    Breast cancer (HCC)    Cancer (HCC)    right breast    Disorder of thyroid    Exposure to medical diagnostic radiation    High blood pressure    High cholesterol    Hypothyroid    Osteoarthritis    right knee    Personal history of antineoplastic chemotherapy    Sleep apnea    CPAP    Type II or unspecified type diabetes mellitus without mention of complication, not stated as uncontrolled    Unspecified essential hypertension    Visual impairment    glasses       Past Surgical History:  Past Surgical History:   Procedure Laterality Date    Appendectomy      Brain surgery Left 2021    Removal of a tumor    Colonoscopy      Hernia surgery  2019    Navel/umbilical hernia    Hysterectomy  2014    TAHBSO    Lumpectomy right      Other accessory  2024    vaginal prolapse    Other surgical history      D and C 2x after miscarriages  and .    Repair of rectocele      mesh    Total abdom hysterectomy         Family Medical History:  Family History   Problem Relation Age of Onset    Cancer Sister         uterine ca terrell 20's    Uterine Cancer Sister 20 - 29       Gyne History:  OB History    Para Term  AB Living    2 2 0 0 0 0   SAB IAB Ectopic Multiple Live Births   0 0 0 0 0     Menarche age 11.  27 at first pregnancy.  Natural menopause at age 49. HEATHER/BSO 2014.    Psychosocial History:  Social History     Socioeconomic History    Marital status:      Spouse name: Not on file    Number of children: Not on file    Years of education: Not on file    Highest education level: Not on file   Occupational History    Not on file   Tobacco Use    Smoking status: Never    Smokeless tobacco: Never   Vaping Use    Vaping status: Never Used   Substance and Sexual Activity    Alcohol use: No    Drug use: No    Sexual activity: Not on file   Other Topics Concern    Caffeine Concern Not Asked    Exercise Not Asked    Seat Belt Not Asked    Special Diet Not Asked    Stress Concern Not Asked    Weight Concern Not Asked   Social History Narrative    Not on file     Social Drivers of Health     Financial Resource Strain: Not on file   Food Insecurity: Low Risk  (12/7/2021)    Received from [facility]    Food Insecurity     Have there been times that your food ran out, and you didn't have money to get more?: No     Are there times that you worry that this might happen?: No   Transportation Needs: Low Risk  (12/7/2021)    Received from [facility]    Transportation Needs     Do you have trouble getting transportation to medical appointments?: No     How do you normally get to and from your appointments?: Not on file   Physical Activity: Not on file   Stress: Not on file   Social Connections: Not on file   Housing Stability: At Risk (8/18/2023)    Received from [facility]     Living Situation: Not on file     Housing Problems: Not on file       Allergies:   Allergies[1]    Current Medications:    Current Outpatient Medications:     levothyroxine 88 MCG Oral Tab, Take 1 tablet (88 mcg total) by mouth every morning., Disp: , Rfl:     rosuvastatin 20 MG Oral Tab, Take 1 tablet (20  mg total) by mouth nightly., Disp: , Rfl:     NON FORMULARY, Sleep 3 DENVER 100mg  Ashwaghanda 60mg,  Melatonin 10mg, Disp: , Rfl:     traMADol 50 MG Oral Tab, Take 1 tablet (50 mg total) by mouth every 6 (six) hours as needed for Pain., Disp: , Rfl:     letrozole 2.5 MG Oral Tab, Take 1 tablet (2.5 mg total) by mouth daily., Disp: 30 tablet, Rfl: 6    PATIENT SUPPLIED MEDICATION, [employer] [facility]: women's 50 + multivitamin/multi minerals supplement 1 tab every morning  Huperzine A 200 mcg one capsule at dinner time  Super B Complex with Vit C one tablet at dinner time  Zinc Gluconate 50 mg  one tablet at dinner time, Disp: , Rfl:     MAGNESIUM CITRATE OR, Take 250 mg by mouth at bedtime., Disp: , Rfl:     empagliflozin (JARDIANCE) 10 MG Oral Tab, Take 1 tablet (10 mg total) by mouth daily., Disp: , Rfl:     acetaminophen 500 MG Oral Tab, Take 2 tablets (1,000 mg total) by mouth as needed., Disp: , Rfl:     Cholecalciferol (VITAMIN D OR), Take 25 mg by mouth daily. Vit D3, Disp: , Rfl:     LUTEIN OR, every morning., Disp: , Rfl:     Ramipril 10 MG Oral Cap, Take 1 capsule (10 mg total) by mouth every morning., Disp: , Rfl:     Review of Systems:    Constitutional: No chills, fevers, malaise, night sweats and weight loss.   Eyes: No visual disturbance, irritation and redness.  Ears, nose, mouth, throat, and face: No hearing loss, tinnitus, hoarseness and voice change.  Respiratory: No cough, sputum, hemoptysis, chest pain, wheezing, dyspnea on exertion  Cardiovascular: No chest pain, palpitations, syncope,orthopnea, PND, edema  Gastrointestinal:No dysphagia, odynophagia, nausea, vomiting, diarrhea, abdominal pain.  Derm: No rash, skin lesions, and pruritus.  Hematologic/lymphatic: No easy bruising, bleeding, and lymphadenopathy.  Musculoskeletal: No myalgias, arthralgias, muscle weakness.  Neurological: No headaches, dizziness, seizures, speech problems, gait problems   Psych: No anxiety/depression.    Vital  Signs:  /85 (BP Location: Radial, Patient Position: Sitting, Cuff Size: adult)   Pulse 81   Temp 97.4 °F (36.3 °C) (Tympanic)   Resp 20   Wt 120.9 kg (266 lb 8 oz)   LMP 12/18/2012   SpO2 93%   BMI 41.74 kg/m²     ECOG PS: 1    Physical Examination:    General: Patient is alert and oriented x 3, not in acute distress.  Accompanied by .  Neurological: Grossly intact.      Labs:         Assessment and Plan:    # R beast DCIS: Diag on routine screening mammogram.  S/p R lumpectomy 10/24.  Path-3.5 mm grade 3 DCIS, negative margins.  100% ER, 60% AR.    We discussed the natural history, course, prognosis and treatment of noninvasive pathologies of the breast.  We discussed that she is still a candidate for ongoing surveillance.  We discussed the role of aromatase inhibitors in reducing the risk of invasive and noninvasive events in the ipsilateral and contralateral side.  We further discussed the side effects of AI's.  At the end of the conversation, she elected to proceed with AI.  Prescription sent to her pharmacy.  Upcoming appointment with radiation oncology.  If she opts for RT, will start AI after RT completed.    Recommend repeating DEXA.  Last one was in 2013.  Follow-up with me in 3 months after starting.    A total of 60 minutes were spent  during this encounter including  face-to-face time, review of pertinent test results, and documentation.           Procedures    XR DEXA BONE DENSITOMETRY (CPT=77080)     Requested Prescriptions     Signed Prescriptions Disp Refills    letrozole 2.5 MG Oral Tab 30 tablet 6     Sig: Take 1 tablet (2.5 mg total) by mouth daily.     Return in about 3 months (around 2/5/2025) for MD visit.      Марина Ricks M.D.    [facility]    [facility]  [address] [place], [address]    11/5/2024             [1]   Allergies  Allergen Reactions    Bactrim [Sulfamethoxazole W/Trimethoprim] OTHER (SEE COMMENTS)     General hives and  swelling of throat    Erythromycin HIVES    Codeine OTHER (SEE COMMENTS)     Drowsy/respiratory rate/O2 levels decrease

## 2024-11-05 ENCOUNTER — OFFICE VISIT (OUTPATIENT)
Dept: HEMATOLOGY/ONCOLOGY | Facility: HOSPITAL | Age: 69
End: 2024-11-05
Attending: INTERNAL MEDICINE
Payer: MEDICARE

## 2024-11-05 VITALS
WEIGHT: 266.5 LBS | HEART RATE: 81 BPM | TEMPERATURE: 97 F | BODY MASS INDEX: 42 KG/M2 | SYSTOLIC BLOOD PRESSURE: 153 MMHG | RESPIRATION RATE: 20 BRPM | DIASTOLIC BLOOD PRESSURE: 85 MMHG | OXYGEN SATURATION: 93 %

## 2024-11-05 DIAGNOSIS — D05.11 DUCTAL CARCINOMA IN SITU (DCIS) OF RIGHT BREAST: ICD-10-CM

## 2024-11-05 DIAGNOSIS — Z78.0 POSTMENOPAUSAL: Primary | ICD-10-CM

## 2024-11-05 DIAGNOSIS — M89.9 BONE DISORDER: ICD-10-CM

## 2024-11-05 PROCEDURE — 99205 OFFICE O/P NEW HI 60 MIN: CPT | Performed by: INTERNAL MEDICINE

## 2024-11-05 RX ORDER — LEVOTHYROXINE SODIUM 88 UG/1
88 TABLET ORAL EVERY MORNING
COMMUNITY

## 2024-11-05 RX ORDER — TRAMADOL HYDROCHLORIDE 50 MG/1
50 TABLET ORAL EVERY 6 HOURS PRN
COMMUNITY

## 2024-11-05 RX ORDER — LETROZOLE 2.5 MG/1
2.5 TABLET, FILM COATED ORAL DAILY
Qty: 30 TABLET | Refills: 6 | Status: SHIPPED | OUTPATIENT
Start: 2024-11-05

## 2024-11-05 RX ORDER — ROSUVASTATIN CALCIUM 20 MG/1
20 TABLET, COATED ORAL NIGHTLY
COMMUNITY
Start: 2024-10-04

## 2024-11-05 NOTE — PROGRESS NOTES
Education Record    Learner:  Patient/ spouse    Disease / Diagnosis: right breast cancer   S/p right lumpectomy 10/18/24    Barriers / Limitations:  None   Comments:    Method:  Discussion   Comments:    General Topics:  Plan of care reviewed   Comments:    Outcome:  Shows understanding   Comments:   Feeling well.   Good ROM, reports a little nipple pain.   ERICK in 2014

## 2024-11-06 ENCOUNTER — HOSPITAL ENCOUNTER (OUTPATIENT)
Dept: BONE DENSITY | Age: 69
Discharge: HOME | End: 2024-11-06
Attending: INTERNAL MEDICINE
Payer: MEDICARE

## 2024-11-06 DIAGNOSIS — Z78.0 POSTMENOPAUSAL: ICD-10-CM

## 2024-11-06 DIAGNOSIS — M89.9 BONE DISORDER: ICD-10-CM

## 2024-11-06 PROCEDURE — 77080 DXA BONE DENSITY AXIAL: CPT | Performed by: INTERNAL MEDICINE

## 2024-11-11 NOTE — CONSULTS
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     Screening mammogram 7/24-new right breast calcifications.  Diagnostic mammogram 8/24-indeterminate calcifications.  US guided right 8:00 biopsy 9/24-grade 3 DCIS.  Associated ALH.    10/18/24: R breast lumpectomy  -solid papillary carcinoma (3.5 mm)  -DCIS, grade 3, with associated necrosis measuring 1.2 cm   -margins negative (closest 3mm anterior)  -disease ER/NM+    Medonc discussed AI  Does have some residual discomfort in the breast    Past Medical History  Past Medical History:    Brain tumor (benign) (HCC)    Breast cancer (HCC)    Cancer (HCC)    right breast    Disorder of thyroid    Exposure to medical diagnostic radiation    High blood pressure    High cholesterol    Hypothyroid    Osteoarthritis    right knee    Personal history of antineoplastic chemotherapy    Sleep apnea    CPAP    Type II or unspecified type diabetes mellitus without mention of complication, not stated as uncontrolled    Unspecified essential hypertension    Visual impairment    glasses       Past Surgical History  Past Surgical History:   Procedure Laterality Date    Appendectomy      Brain surgery Left 12/07/2021    Removal of a tumor    Colonoscopy      Hernia surgery  04/2019    Navel/umbilical hernia    Hysterectomy  2014    TAHBSO    Lumpectomy right      Other accessory  02/2024    vaginal prolapse    Other surgical history      D and C 2x after miscarriages 1980 and 1992.    Repair of rectocele      mesh     Total abdom hysterectomy         Medications  Current Outpatient Medications   Medication Sig Dispense Refill    zinc sulfate 220 (50 Zn) MG Oral Cap Take 1 capsule (220 mg total) by mouth daily.      vitamin B-12 50 MCG Oral Tab Take 1 tablet (50 mcg total) by mouth daily.      B Complex Vitamins (VITAMIN B COMPLEX 100 IJ) Inject as directed.      NON FORMULARY 2 tablets. Huperzine-A 200mg  Alphapoic-Acid      levothyroxine 88 MCG Oral Tab Take 1 tablet (88 mcg total) by mouth every morning.      rosuvastatin 20 MG Oral Tab Take 1 tablet (20 mg total) by mouth nightly.      NON FORMULARY Sleep 3  DENVER 100mg   Ashwaghanda 60mg,   Melatonin 10mg      PATIENT SUPPLIED MEDICATION [employer]: women's 50 + multivitamin/multi minerals supplement 1 tab every morning    Huperzine A 200 mcg one capsule at dinner time    Super B Complex with Vit C one tablet at dinner time    Zinc Gluconate 50 mg  one tablet at dinner time      MAGNESIUM CITRATE OR Take 250 mg by mouth at bedtime.      empagliflozin (JARDIANCE) 10 MG Oral Tab Take 1 tablet (10 mg total) by mouth daily.      acetaminophen 500 MG Oral Tab Take 2 tablets (1,000 mg total) by mouth as needed.      Cholecalciferol (VITAMIN D OR) Take 25 mg by mouth daily. Vit D3      LUTEIN OR every morning.      Ramipril 10 MG Oral Cap Take 1 capsule (10 mg total) by mouth every morning.      traMADol 50 MG Oral Tab Take 1 tablet (50 mg total) by mouth every 6 (six) hours as needed for Pain.      letrozole 2.5 MG Oral Tab Take 1 tablet (2.5 mg total) by mouth daily. (Patient not taking: Reported on 2024) 30 tablet 6       Allergies  Allergies[1]    OB/GYN History  Pt is a   Pt was 27 years old at time of first pregnancy.    She has cumulative breastfeeding history of 1 months.  She achieved menarche at age 11 and LMP Patient's last menstrual period was 2012.  Age of Menopause: 49  Type: natural  She denies any history of hormone replacement therapy f .  She  has history of oral contraceptive use for 1 years, last in .  She denies infertility treatment to achieve pregnancy.    Social History  -Living situation: , lives in [place]  -Tobacco: never smoker  -Alcohol: none    Family History  Family History   Problem Relation Age of Onset    Cancer Sister         uterine ca terrell 20's    Uterine Cancer Sister 20 - 29       Review of Systems  Otherwise negative except as noted in HPI.     No prior history of radiation or chemotherapy.  No history of lupus, collagen-vascular disease, or inflammatory bowel disease.     Physical Exam  Vitals:    24 1255   BP: 155/76   Pulse: 70   Resp: 20   Temp: 98.1 °F (36.7 °C)     ECO    Gen: NAD  HEENT: NCAT, EOMI  Neck: no LAD  CV: RRR  Lungs: CTAB  Ext: wwp  Neuro: CN II-XII grossly intact  Breast: R breast with well healing incision, no masses.    Assessment: 68yo F with imaging detected R breast DCIS. She underwent margin negative lumpectomy showing a 3.5 mm solid papillary carcinoma with 1.2cm of grade 3 DCIS that was ER+. She presents for consideration of RT.    Plan:     I discussed the above clinical situation with the patient at the time of consultation.     We discussed post-operative observation/endocrine therapy alone vs a course of hypofractionated external beam radiotherapy to the right breast over 3-4 weeks.     We discussed that radiotherapy can reduce the risk of an in-breast recurrence by half but does not alter survival. The prone position will be considered for cardiac/pulmonary sparing.    We discussed the potential short-term and long-term side effects of radiotherapy. All questions were answered, and no guarantee of safety or efficacy was made. Alternatives to radiotherapy were discussed with the patient.    The patient has decided to omit radiotherapy. Follow-up with surgery and [facility] per plan.    Anne Cherry MD  Radiation Oncology    [facility] including chart review, personal review of  imaging, and time spent with the patient in counseling.                  [1]   Allergies  Allergen Reactions    Bactrim [Sulfamethoxazole W/Trimethoprim] OTHER (SEE COMMENTS)     General hives and swelling of throat    Erythromycin HIVES    Codeine OTHER (SEE COMMENTS)     Drowsy/respiratory rate/O2 levels decrease

## 2024-11-12 ENCOUNTER — HOSPITAL ENCOUNTER (OUTPATIENT)
Dept: RADIATION ONCOLOGY | Facility: HOSPITAL | Age: 69
Discharge: HOME | End: 2024-11-12
Attending: INTERNAL MEDICINE
Payer: MEDICARE

## 2024-11-12 VITALS
WEIGHT: 268.19 LBS | OXYGEN SATURATION: 95 % | TEMPERATURE: 98 F | HEIGHT: 67 IN | HEART RATE: 70 BPM | SYSTOLIC BLOOD PRESSURE: 155 MMHG | RESPIRATION RATE: 20 BRPM | BODY MASS INDEX: 42.09 KG/M2 | DIASTOLIC BLOOD PRESSURE: 76 MMHG

## 2024-11-12 DIAGNOSIS — D05.11 DUCTAL CARCINOMA IN SITU (DCIS) OF RIGHT BREAST: Primary | ICD-10-CM

## 2024-11-12 PROCEDURE — 99214 OFFICE O/P EST MOD 30 MIN: CPT

## 2024-11-12 RX ORDER — MULTIVITAMIN WITH IRON
50 TABLET ORAL DAILY
COMMUNITY

## 2024-11-12 RX ORDER — ZINC SULFATE 50(220)MG
220 CAPSULE ORAL DAILY
COMMUNITY

## 2024-11-12 NOTE — PROGRESS NOTES
Nursing Consultation Note  Patient: Joanie Ramos  YOB: 1955  Age: 69 year old  Radiation Oncologist: Dr. Anne Cherry  Referring Physician: Reagan Ricks  Diagnosis: RIGHT BREAST CANCER  Consult Date: 11/12/2024      Chemotherapy: N/A  Labs: Reviewed  Imaging: Reviewed  Is the patient of child-bearing age?         No  Menarche: 12 y/o  Menopause: 50's  OCP use x 6 months  No HRT use  Breastfeed: YES  BRA SIZE: 44DD    Has the patient received radiation therapy in the past? no  Does the patient have an implantable device?No   Patient has/has had:     1. Assistive Devices: N/A    2. Flu Vaccination: no-referral to ask PCP    3. Pneumonia Vaccination:  no--referral to ask PCP    Vital Signs:   Vitals:    11/12/24 1255   BP: 155/76   Pulse: 70   Resp: 20   Temp: 98.1 °F (36.7 °C)   ,   Wt Readings from Last 6 Encounters:   11/12/24 121.7 kg (268 lb 3.2 oz)   11/05/24 120.9 kg (266 lb 8 oz)   10/29/24 121.8 kg (268 lb 9.6 oz)   10/18/24 121.3 kg (267 lb 6.4 oz)   10/04/24 120.2 kg (265 lb)   09/06/24 118.8 kg (261 lb 14.4 oz)       Nursing Note: Presented with R breast calcs seen in screening mammo in July. Diagnostic mammo done, then biopsy on 9/24/24 showed R breast DCIS gr 3, ER/OH+. Met with Dr. Barnett, underwent lumpectomy on 10/18/24. Path showed R breast with solid papillary ca, DCIS gr 3, clear margins. Met with Dr. Ricks, discussed AI and referred for RT consult. Pt here with , AOx4. Has intermittent pain to breast. Denies edema or erythema to site. Good ROM to  RUE.         Review of Systems   Constitutional: Negative.    HENT: Negative.     Eyes: Negative.    Respiratory: Negative.          Uses CPAP   Cardiovascular: Negative.    Gastrointestinal:  Positive for constipation.        Chronic constipation   Endocrine: Negative.    Genitourinary: Negative.    Musculoskeletal: Negative.    Skin: Negative.    Allergic/Immunologic: Negative.    Neurological: Negative.     Hematological: Negative.    Psychiatric/Behavioral: Negative.            Allergies:  Allergies[1]    Current Outpatient Medications   Medication Sig Dispense Refill    zinc sulfate 220 (50 Zn) MG Oral Cap Take 1 capsule (220 mg total) by mouth daily.      vitamin B-12 50 MCG Oral Tab Take 1 tablet (50 mcg total) by mouth daily.      B Complex Vitamins (VITAMIN B COMPLEX 100 IJ) Inject as directed.      NON FORMULARY 2 tablets. Huperzine-A 200mg  Alphapoic-Acid      levothyroxine 88 MCG Oral Tab Take 1 tablet (88 mcg total) by mouth every morning.      rosuvastatin 20 MG Oral Tab Take 1 tablet (20 mg total) by mouth nightly.      NON FORMULARY Sleep 3  DENVER 100mg   Ashwaghanda 60mg,   Melatonin 10mg      PATIENT SUPPLIED MEDICATION [employer]: women's 50 + multivitamin/multi minerals supplement 1 tab every morning    Huperzine A 200 mcg one capsule at dinner time    Super B Complex with Vit C one tablet at dinner time    Zinc Gluconate 50 mg  one tablet at dinner time      MAGNESIUM CITRATE OR Take 250 mg by mouth at bedtime.      empagliflozin (JARDIANCE) 10 MG Oral Tab Take 1 tablet (10 mg total) by mouth daily.      acetaminophen 500 MG Oral Tab Take 2 tablets (1,000 mg total) by mouth as needed.      Cholecalciferol (VITAMIN D OR) Take 25 mg by mouth daily. Vit D3      LUTEIN OR every morning.      Ramipril 10 MG Oral Cap Take 1 capsule (10 mg total) by mouth every morning.      traMADol 50 MG Oral Tab Take 1 tablet (50 mg total) by mouth every 6 (six) hours as needed for Pain.      letrozole 2.5 MG Oral Tab Take 1 tablet (2.5 mg total) by mouth daily. (Patient not taking: Reported on 11/12/2024) 30 tablet 6       Preferred Pharmacy:    [facility] [id number] [address] [facility] [facility] [phone], [phone]  [address] [facility] [facility] [address]  Phone: [phone] Fax: [phone]      Past Medical History:    Brain tumor (benign) (HCC)    Breast cancer (HCC)    Cancer (HCC)     right breast    Disorder of thyroid    Exposure to medical diagnostic radiation    High blood pressure    High cholesterol    Hypothyroid    Osteoarthritis    right knee    Personal history of antineoplastic chemotherapy    Sleep apnea    CPAP    Type II or unspecified type diabetes mellitus without mention of complication, not stated as uncontrolled    Unspecified essential hypertension    Visual impairment    glasses       Past Surgical History:   Procedure Laterality Date    Appendectomy      Brain surgery Left 12/07/2021    Removal of a tumor    Colonoscopy      Hernia surgery  04/2019    Navel/umbilical hernia    Hysterectomy  2014    TAHBSO    Lumpectomy right      Other accessory  02/2024    vaginal prolapse    Other surgical history      D and C 2x after miscarriages 1980 and 1992.    Repair of rectocele      mesh    Total abdom hysterectomy         Social History     Socioeconomic History    Marital status:      Spouse name: Not on file    Number of children: 1    Years of education: Not on file    Highest education level: Not on file   Occupational History    Occupation: RETIRED   Tobacco Use    Smoking status: Never    Smokeless tobacco: Never   Vaping Use    Vaping status: Never Used   Substance and Sexual Activity    Alcohol use: No    Drug use: No    Sexual activity: Not Currently     Partners: Male   Other Topics Concern    Caffeine Concern Not Asked    Exercise Not Asked    Seat Belt Not Asked    Special Diet Not Asked    Stress Concern Not Asked    Weight Concern Not Asked   Social History Narrative    , lives with     [place]     Social Drivers of Health     Financial Resource Strain: Not on file   Food Insecurity: Low Risk  (12/7/2021)    Received from [facility]    Food Insecurity     Have there been times that your food ran out, and you didn't have money to get more?: No     Are there times that you worry that this might  happen?: No   Transportation Needs: Low Risk  (12/7/2021)    Received from [facility]    Transportation Needs     Do you have trouble getting transportation to medical appointments?: No     How do you normally get to and from your appointments?: Not on file   Physical Activity: Not on file   Stress: Not on file   Social Connections: Not on file   Housing Stability: At Risk (8/18/2023)    Received from [facility]     Living Situation: Not on file     Housing Problems: Not on file       ECOG:  Grade 0 - Fully active, able to carry on all predisease activities without restrictions.      Education: YES  Knowledge Deficit Plan Of Care:    Problem:  Knowledge Deficit    Problems related to:    Radiation therapy    Interventions:  Instruct on purpose of radiation therapy    Expected Outcomes:  Knowledge of radiation therapy    Progress Toward Outcome:  Making progress    Pamphlets/Handouts Given to Patient:  Understanding radiation therapy      Are ADL's met?  Yes  Does patient feel safe in their environment?  Yes  Care decisions:  Patient and/or surrogate IS involved in care decisions.  Advanced directives:  Patient DOES NOT have advanced directives.  Transportation:  Adequate transportation available for expected visits    Pain:  Pain Loc: Back (chronic tailbone pain);Pain Score: 4   ;    ;          [1]   Allergies  Allergen Reactions    Bactrim [Sulfamethoxazole W/Trimethoprim] OTHER (SEE COMMENTS)     General hives and swelling of throat    Erythromycin HIVES    Codeine OTHER (SEE COMMENTS)     Drowsy/respiratory rate/O2 levels decrease

## 2024-11-12 NOTE — PATIENT INSTRUCTIONS
- WE WILL CALL TO SCHEDULE YOUR CT SIMULATION FOR RADIATION PLANNING.       - IF YOU HAVE ANY QUESTIONS OR CONCERNS REGARDING RADIATION THERAPY, PLEASE CALL [phone].

## 2024-11-19 ENCOUNTER — APPOINTMENT (OUTPATIENT)
Dept: RADIATION ONCOLOGY | Facility: HOSPITAL | Age: 69
End: 2024-11-19
Attending: INTERNAL MEDICINE
Payer: MEDICARE

## 2024-11-25 ENCOUNTER — TELEPHONE (OUTPATIENT)
Dept: HEMATOLOGY/ONCOLOGY | Facility: HOSPITAL | Age: 69
End: 2024-11-25

## 2024-11-25 NOTE — TELEPHONE ENCOUNTER
Called patient to discuss the message received in regards to her care from Dr. Ricks. Patient declined radiation therapy and Dr. Ricks wants the patient to start letrozole that was ordered to her pharmacy and discussed that with the patient. Stated she wants a follow-up appointment 3 months and scheduled patient with Dr. Ricks on February 25, 2025 at 0830 [facility]. Discussed survivorship appointment and what entails and the location and scheduled patient with Sneha Perkins on January 16, 2025 at 0900. She thanked me for the phone call and assistance.

## 2024-12-01 ENCOUNTER — HOSPITAL ENCOUNTER (OUTPATIENT)
Dept: RADIATION ONCOLOGY | Facility: HOSPITAL | Age: 69
Discharge: HOME | End: 2024-12-01
Attending: INTERNAL MEDICINE
Payer: MEDICARE

## 2025-01-16 ENCOUNTER — OFFICE VISIT (OUTPATIENT)
Age: 70
End: 2025-01-16
Attending: NURSE PRACTITIONER
Payer: MEDICARE

## 2025-01-16 DIAGNOSIS — Z08 ENCOUNTER FOR FOLLOW-UP EXAMINATION AFTER COMPLETED TREATMENT FOR MALIGNANT NEOPLASM: ICD-10-CM

## 2025-01-16 DIAGNOSIS — Z85.3 PERSONAL HISTORY OF BREAST CANCER: Primary | ICD-10-CM

## 2025-01-16 DIAGNOSIS — Z71.9 COUNSELING, UNSPECIFIED: ICD-10-CM

## 2025-01-16 RX ORDER — TRAZODONE HYDROCHLORIDE 50 MG/1
50 TABLET, FILM COATED ORAL NIGHTLY
COMMUNITY
Start: 2025-01-13

## 2025-01-16 NOTE — PROGRESS NOTES
I met with Joanie and her  for a Survivorship Clinic visit to provide a survivorship care plan (SCP) and information related to post-treatment care.  She is a 69 year old who had a screening mammogram on 7/20/24 that showed new right breast calcifications.   Diagnostic mammogram 8/15/24 showed indeterminate calcifications. On 9/24/24 she had an US guided right 8:00 biopsy with pathology that showed grade 3 DCIS with associated ALH. On 10/18/24 Dr. Luanne Barnett performed a right lumpectomy. Pathology showed pTis (DCIS,cN0, Gr3 ductal carcinoma in situ of the right breast with non-invasive solid papillary carcinoma, ER+, AK+. She was seen by Dr. Марина Ricks who recommended adjuvant endocrine therapy with Letrozole and a consult to radiation therapy. She was seen by Dr. Anne Cherry for discussion re radiation therapy and Joanie decided to omit. She started Letrozole on 12/6/24.(See Oncology Treatment Summary SCP for details).      Current condition/issues: Joanie is doing well post-treatment. She has no post-surgical issues. She has been taking Letrozole for 6 weeks. She takes each PM and notes tolerable hot flashes during the day and in the night. She has no other side effects. Baseline bone density in 11/24 was normal. She takes Vitamin D3 1000IU/day.    She eats fairly healthy and current BMI is 41. She sometimes walks her dogs, but otherwise does no formal exercise. We discussed weight bearing and strength training exercise and she has a treadmill and weights/bands at home. Reviewed [facility], but not interested at this time since facility is far from her home. She has sleep apnea and history of poor sleep, getting 4 hours/night. She often wakes, then cannot return to sleep. She just received prescription for Trazodone and plans to start it tonight. She takes various supplements, some for sleep.    She denies anxiety or depression. She had a benign brain tumor removed in 12/2021 and has noted  mood changes and mild memory issues. She has frequent follow-up with Neurology and Neurosurgery and has done well. She has good support from her .      Survivorship Care Plan and letter: She was provided a hard copy of the SCP and a letter that outlined the purpose of the visit and plan.  We reviewed all elements of both documents. She is aware that a letter and SCP will be sent to her PCP, Dr. Rui Abel.     Reviewed Cancer Surveillance and that Dr. Ricks will oversee this care with Dr. Barnett. She will see Dr. Ricks on 2/25 and will get bilateral mammogram on 5/5. She should schedule follow-up visit with Dr. Barnett after mammogram. Next bone density will be due 11/26.       Reviewed Schedule of other clinic visits with Primary Care and specialists: Dr. Abel will continue to manage all general health care recommended for age and gender including cancer screening tests.  She is up-to-date with screening, had colonoscopy last week. She was encouraged to continue to see specialists at usual intervals and is very organized with her appointments.     Reviewed concerning symptoms that she should report to any Provider.      Reviewed possible late and long-term effects related to the treatment that was received.  We reviewed management of hormone related side effects including vaginal dryness if that were to occur.     Reviewed common cancer survivor issues and resources available. Reviewed resources for psychosocial support, weight loss, exercise options, stress management for sleep.      Reviewed Lifestyle/behaviors that can affect ongoing health, including the risk for the cancer coming back or developing another cancer.  We reviewed importance of physical activity including aerobic, strength training and weight bearing exercise.  We reviewed a plant based diet.  We reviewed skin care and sun safety.     The patient received a take-home folder that included the following survivorship resources:    -SCP and patient letter  -Breast Survivorship Guide   -[facility] - nutrition and exercise classes, mind/body programs, education, support groups  -[facility]-education, support groups, special programs, nutrition and exercise classes, movement classes, mind/body programs, survivorship programs, individual counseling  -[facility]  -[facility]  -[facility]  -[employer] handout-nutrition, physical activity  -ACS Cancer Screening Guidelines  -Websites: American Cancer Society, Living Beyond Breast Cancer, ACS Survivorship Network     The patient and her  were given the opportunity to ask questions.  She had a few questions and verbalized understanding of the information we discussed today.  My total time spent caring for the patient on the day of the encounter: 80 minutes (10 minutes reviewing chart, 50 minutes of face to face counseling regarding survivorship education, review of care plan and additional resources and 20 minutes of documentation).  She was encouraged to call with any further questions.   OMAR Carlson

## 2025-02-21 NOTE — PROGRESS NOTES
[facility] Progress Note      Patient Name:  Joanie Ramos  YOB: 1955  Medical Record Number:  [id number]    Date of visit:  2/25/2025    CHIEF COMPLAINT: No chief complaint on file.      HPI:     ***    ROS:     Constitutional: no fever, chills fatigue,night sweats or weight loss  Neurologic: no headache, seizures, diplopia or weakness  Respiratory: no cough, SOB or hemoptysis  Cardiovascular: no chest pain, ankle swelling, WOOD  GI: no nausea, vomiting, diarrhea or BRBPR  Musculoskeletal: no body-ache or joint pain  Dermatologic: no alopecia, rash, pruritis  : no hematuria, dysuria or frequency  Psych: no confusion or depression   Heme: no easy bruising or bleeding     ALLERGIES:  Allergies[1]    MEDICATIONS:    Current Outpatient Medications   Medication Sig Dispense Refill    traZODone 50 MG Oral Tab Take 1 tablet (50 mg total) by mouth nightly. (Patient not taking: Reported on 1/16/2025)      zinc sulfate 220 (50 Zn) MG Oral Cap Take 1 capsule (220 mg total) by mouth daily.      vitamin B-12 50 MCG Oral Tab Take 1 tablet (50 mcg total) by mouth daily.      B Complex Vitamins (VITAMIN B COMPLEX 100 IJ) Inject as directed.      NON FORMULARY 2 tablets. Huperzine-A 200mg  Alphapoic-Acid      levothyroxine 88 MCG Oral Tab Take 1 tablet (88 mcg total) by mouth every morning.      rosuvastatin 20 MG Oral Tab Take 1 tablet (20 mg total) by mouth nightly.      NON FORMULARY Sleep 3  DENVER 100mg   Ashwaghanda 60mg,   Melatonin 10mg      letrozole 2.5 MG Oral Tab Take 1 tablet (2.5 mg total) by mouth daily. 30 tablet 6    PATIENT SUPPLIED MEDICATION [employer]: women's 50 + multivitamin/multi minerals supplement 1 tab every morning    Huperzine A 200 mcg one capsule at dinner time    Super B Complex with Vit C one tablet at dinner time    Zinc Gluconate 50 mg  one tablet at dinner time      MAGNESIUM CITRATE OR Take 250 mg by mouth at bedtime.      empagliflozin (JARDIANCE)  10 MG Oral Tab Take 1 tablet (10 mg total) by mouth daily.      acetaminophen 500 MG Oral Tab Take 2 tablets (1,000 mg total) by mouth as needed.      Cholecalciferol (VITAMIN D OR) Take 25 mg by mouth daily. Vit D3      LUTEIN OR every morning.      Ramipril 10 MG Oral Cap Take 1 capsule (10 mg total) by mouth every morning.         VITALS:  Height: --  Weight: --  BSA (Calculated - sq m): --  Pulse: --  BP: --  Temp: --  Do Not Use - Resp Rate: --  SpO2: --    PS:  ECOG: ***    PHYSICAL EXAM:    General: alert and oriented x 3, not in acute distress.  Neck: No adenopathy.  CVS: S1S2, regular  Rhythm, no murmurs.   Lungs: Clear to auscultation and percussion.  Abdomen: Soft, non tender, no hepato-splenomegaly.  Extremities:  No edema.  CNS: no focal deficit    Emotional well being: Patient's emotional well being was assessed.  No issues requiring acute psychosocial intervention were identified.     LABS:     No results found for this or any previous visit (from the past 24 hours).    ASSESSMENT AND PLAN:     ***  ORDERS PLACED:    No orders of the defined types were placed in this encounter.      Requested Prescriptions      No prescriptions requested or ordered in this encounter       No follow-ups on file.    Марина Ricks M.D.    [facility]    [facility]  [address] [place] [address] [place], [address]    2/25/2025       [1]   Allergies  Allergen Reactions    Bactrim [Sulfamethoxazole W/Trimethoprim] OTHER (SEE COMMENTS)     General hives and swelling of throat    Erythromycin HIVES    Codeine OTHER (SEE COMMENTS)     Drowsy/respiratory rate/O2 levels decrease

## 2025-02-25 ENCOUNTER — APPOINTMENT (OUTPATIENT)
Age: 70
End: 2025-02-25
Attending: INTERNAL MEDICINE
Payer: MEDICARE

## 2025-03-11 ENCOUNTER — APPOINTMENT (OUTPATIENT)
Age: 70
End: 2025-03-11
Attending: NURSE PRACTITIONER
Payer: MEDICARE

## 2025-03-17 NOTE — PROGRESS NOTES
[facility] Progress Note      Patient Name:  Joanie Ramos  YOB: 1955  Medical Record Number:  [id number]    Date of visit:  3/18/2025    CHIEF COMPLAINT: R breast DCIS    HPI:     69 year old that I initially saw 11/24 after she underwent R lumpectomy 10/24.  Path-DCIS.  Met with radiation oncology.  Elected to defer RT.  Letrozole started 12/24, presents for follow-up.    Hot flashes, night sweats.  Chronic joint pain and tailbone pain, no specific areas of bone pain.    ROS:     Constitutional: no fever, chills fatigue,night sweats or weight loss  Neurologic: no headache, seizures, diplopia or weakness  Respiratory: no cough, SOB or hemoptysis  Cardiovascular: no chest pain, ankle swelling, WOOD  GI: no nausea, vomiting, diarrhea or BRBPR  Musculoskeletal: Tailbone pain, joint pain.  Dermatologic: no alopecia, rash, pruritis  : no hematuria, dysuria or frequency  Psych: no confusion or depression   Heme: no easy bruising or bleeding     ALLERGIES:  Allergies[1]    MEDICATIONS:    Current Outpatient Medications   Medication Sig Dispense Refill    nystatin-triamcinolone 100,000-0.1 Units/g-% External Ointment Apply twice a day for 5-7 days      mupirocin 2 % External Ointment       traZODone 50 MG Oral Tab Take 1 tablet (50 mg total) by mouth nightly.      zinc sulfate 220 (50 Zn) MG Oral Cap Take 1 capsule (220 mg total) by mouth daily.      vitamin B-12 50 MCG Oral Tab Take 1 tablet (50 mcg total) by mouth daily.      B Complex Vitamins (VITAMIN B COMPLEX 100 IJ) Inject as directed.      NON FORMULARY 2 tablets. Huperzine-A 200mg  Alphapoic-Acid      levothyroxine 88 MCG Oral Tab Take 1 tablet (88 mcg total) by mouth every morning.      rosuvastatin 20 MG Oral Tab Take 0.75 tablets (15 mg total) by mouth nightly. Pt taking 15mg      NON FORMULARY Sleep 3  DENVER 100mg   Ashwaghanda 60mg,   Melatonin 10mg      letrozole 2.5 MG Oral Tab Take 1 tablet (2.5 mg total) by mouth  daily. 30 tablet 6    PATIENT SUPPLIED MEDICATION Sprout Route: women's 50 + multivitamin/multi minerals supplement 1 tab every morning    Huperzine A 200 mcg one capsule at dinner time    Super B Complex with Vit C one tablet at dinner time    Zinc Gluconate 50 mg  one tablet at dinner time      MAGNESIUM CITRATE OR Take 250 mg by mouth at bedtime.      empagliflozin (JARDIANCE) 10 MG Oral Tab Take 1 tablet (10 mg total) by mouth daily.      acetaminophen 500 MG Oral Tab Take 2 tablets (1,000 mg total) by mouth as needed.      Cholecalciferol (VITAMIN D OR) Take 25 mg by mouth daily. Vit D3      LUTEIN OR every morning.      Ramipril 10 MG Oral Cap Take 1 capsule (10 mg total) by mouth every morning.         VITALS:  Height: --  Weight: 120.3 kg (265 lb 3.2 oz) (1413)  BSA (Calculated - sq m): --  Pulse: 89 ( 1413)  BP: 170/74 (1413)  Temp: 98 °F (36.7 °C) (1413)  Do Not Use - Resp Rate: --  SpO2: 93 % (1413)    PS:  ECO    PHYSICAL EXAM:    General: alert and oriented x 3, not in acute distress.  Accompanied by spouse.  Neck: No adenopathy.  CVS: S1S2, regular  Rhythm, no murmurs.   Lungs: Clear to auscultation and percussion.  Abdomen: Soft, non tender, no hepato-splenomegaly.  Extremities:  No edema.  CNS: no focal deficit  Breast exam: Done sitting upright.  Well-healed scar right side.  No masses or axillary adenopathy.    Emotional well being: Patient's emotional well being was assessed.  No issues requiring acute psychosocial intervention were identified.     LABS:     No results found for this or any previous visit (from the past 24 hours).    ASSESSMENT AND PLAN:     # R breast DCIS: S/p R lumpectomy 10/24 after workup of abnormal screening mammogram.  Path-3.5 mm grade 3 DCIS.  Negative margins, 100% ER, 60% MN.  Elected to defer RT.    Letrozole started , continue till  if she tolerates it.  Due for bilateral mammogram .    DEXA  normal, repeat  11/26.    Survivorship issues were addressed with the patient.  Patient-reported issues included arthralgias.  Recommended interventions as follows:  Supportive care..     ORDERS PLACED:    Return in about 4 months (around 7/18/2025) for MD visit.    Марина Ricks M.D.    [facility]    [facility]  [address] [place], [address]    3/18/2025         [1]   Allergies  Allergen Reactions    Bactrim [Sulfamethoxazole W/Trimethoprim] OTHER (SEE COMMENTS)     General hives and swelling of throat    Erythromycin HIVES    Codeine OTHER (SEE COMMENTS)     Drowsy/respiratory rate/O2 levels decrease

## 2025-03-18 ENCOUNTER — OFFICE VISIT (OUTPATIENT)
Age: 70
End: 2025-03-18
Attending: NURSE PRACTITIONER
Payer: MEDICARE

## 2025-03-18 VITALS
WEIGHT: 265.19 LBS | RESPIRATION RATE: 18 BRPM | SYSTOLIC BLOOD PRESSURE: 170 MMHG | DIASTOLIC BLOOD PRESSURE: 74 MMHG | OXYGEN SATURATION: 93 % | HEART RATE: 89 BPM | BODY MASS INDEX: 42 KG/M2 | TEMPERATURE: 98 F

## 2025-03-18 DIAGNOSIS — M89.9 BONE DISORDER: Primary | ICD-10-CM

## 2025-03-18 DIAGNOSIS — M89.9 BONE DISORDER: ICD-10-CM

## 2025-03-18 DIAGNOSIS — Z85.3 PERSONAL HISTORY OF BREAST CANCER: ICD-10-CM

## 2025-03-18 DIAGNOSIS — D05.11 DUCTAL CARCINOMA IN SITU (DCIS) OF RIGHT BREAST: ICD-10-CM

## 2025-03-18 RX ORDER — MUPIROCIN 20 MG/G
OINTMENT TOPICAL
COMMUNITY
Start: 2024-12-11

## 2025-03-18 RX ORDER — NYSTATIN AND TRIAMCINOLONE ACETONIDE 100000; 1 [USP'U]/G; MG/G
OINTMENT TOPICAL
COMMUNITY
Start: 2024-12-03

## 2025-03-18 NOTE — PROGRESS NOTES
Education Record    Learner:  Patient/     Disease / Diagnosis:right breast DCIS     Barriers / Limitations:  None   Comments:    Method:  Discussion   Comments:    General Topics:  Plan of care reviewed   Comments:    Outcome:  Shows understanding:   Next mammogram is scheduled.   Taking letrozole. Started Nov 2024  Reports hot flashes, and night sweats.   Denies joint pain.   Pt reports that she gets labs at her PCP due to diabetes Q 3 months, uses [facility].   Deferred Vit d, due to uncertainty of coverage.

## 2025-05-05 ENCOUNTER — HOSPITAL ENCOUNTER (OUTPATIENT)
Dept: MAMMOGRAPHY | Facility: HOSPITAL | Age: 70
Discharge: HOME | End: 2025-05-05
Attending: SURGERY
Payer: MEDICARE

## 2025-05-05 DIAGNOSIS — D05.11 BREAST NEOPLASM, TIS (DCIS), RIGHT: ICD-10-CM

## 2025-05-05 PROCEDURE — 77066 DX MAMMO INCL CAD BI: CPT | Performed by: SURGERY

## 2025-05-05 PROCEDURE — 77062 BREAST TOMOSYNTHESIS BI: CPT | Performed by: SURGERY

## 2025-05-07 DIAGNOSIS — D05.11 BREAST NEOPLASM, TIS (DCIS), RIGHT: Primary | ICD-10-CM

## 2025-05-30 ENCOUNTER — TELEPHONE (OUTPATIENT)
Facility: LOCATION | Age: 70
End: 2025-05-30

## 2025-05-30 RX ORDER — LETROZOLE 2.5 MG/1
2.5 TABLET, FILM COATED ORAL DAILY
Qty: 90 TABLET | Refills: 0 | Status: SHIPPED | OUTPATIENT
Start: 2025-05-30

## 2025-07-15 NOTE — PROGRESS NOTES
[facility] Progress Note      Patient Name:  Joanie Ramos  YOB: 1955  Medical Record Number:  [id number]    Date of visit:  7/16/2025    CHIEF COMPLAINT: R breast DCIS    HPI:     70 year old that I have followed since 11/24, s/p R lumpectomy 10/24h-DCIS.  Met with radiation oncology and elected to defer RT.  Letrozole started 12/24, presents for follow-up.    Significant hot flashes.    ROS:     Constitutional: no fever, chills fatigue,night sweats or weight loss  Neurologic: no headache, seizures, diplopia or weakness  Respiratory: no cough, SOB or hemoptysis  Cardiovascular: no chest pain, ankle swelling, WOOD  GI: no nausea, vomiting, diarrhea or BRBPR  Musculoskeletal: Tailbone pain, joint pain.  Dermatologic: no alopecia, rash, pruritis  : no hematuria, dysuria or frequency  Psych: no confusion or depression   Heme: no easy bruising or bleeding     ALLERGIES:  Allergies[1]    MEDICATIONS:    Current Outpatient Medications   Medication Sig Dispense Refill    LETROZOLE 2.5 MG Oral Tab TAKE 1 TABLET BY MOUTH DAILY 90 tablet 0    nystatin-triamcinolone 100,000-0.1 Units/g-% External Ointment       mupirocin 2 % External Ointment       traZODone 50 MG Oral Tab Take 1 tablet (50 mg total) by mouth nightly.      zinc sulfate 220 (50 Zn) MG Oral Cap Take 1 capsule (220 mg total) by mouth in the morning.      vitamin B-12 50 MCG Oral Tab Take 1 tablet (50 mcg total) by mouth in the morning.      B Complex Vitamins (VITAMIN B COMPLEX 100 IJ) Inject as directed.      NON FORMULARY 2 tablets. Huperzine-A 200mg  Alphapoic-Acid      levothyroxine 88 MCG Oral Tab Take 1 tablet (88 mcg total) by mouth every morning.      rosuvastatin 20 MG Oral Tab Take 0.75 tablets (15 mg total) by mouth nightly. Pt taking 15mg      NON FORMULARY Sleep 3  DENVER 100mg   Ashwaghanda 60mg,   Melatonin 10mg      PATIENT SUPPLIED MEDICATION Breath of Life: women's 50 + multivitamin/multi minerals  supplement 1 tab every morning    Huperzine A 200 mcg one capsule at dinner time    Super B Complex with Vit C one tablet at dinner time    Zinc Gluconate 50 mg  one tablet at dinner time      MAGNESIUM CITRATE OR Take 250 mg by mouth at bedtime.      empagliflozin (JARDIANCE) 10 MG Oral Tab Take 1 tablet (10 mg total) by mouth in the morning.      acetaminophen 500 MG Oral Tab Take 2 tablets (1,000 mg total) by mouth as needed.      Cholecalciferol (VITAMIN D OR) Take 25 mg by mouth in the morning. Vit D3.      LUTEIN OR every morning.      Ramipril 10 MG Oral Cap Take 1 capsule (10 mg total) by mouth every morning.         VITALS:  Height: --  Weight: 123.4 kg (272 lb) (1358)  BSA (Calculated - sq m): --  Pulse: 84 (1358)  BP: 148/80 (1358)  Temp: 96.9 °F (36.1 °C) (1358)  Do Not Use - Resp Rate: --  SpO2: 93 % (1358)    PS:  ECO    PHYSICAL EXAM:    General: alert and oriented x 3, not in acute distress.  Accompanied by spouse.  Neck: No adenopathy.  CVS: S1S2, regular  Rhythm, no murmurs.   Lungs: Clear to auscultation and percussion.  Abdomen: Soft, non tender, no hepato-splenomegaly.  Extremities:  No edema.  CNS: no focal deficit  Breast exam: Deferred.    Emotional well being: Patient's emotional well being was assessed.  No issues requiring acute psychosocial intervention were identified.     LABS:     No results found for this or any previous visit (from the past 24 hours).    ASSESSMENT AND PLAN:     # R breast DCIS: S/p R lumpectomy 10/24 -3.5 mm grade 3 DCIS.  Negative margins, 100% ER, 60% CA.  Elected to defer RT.    Letrozole started , continue till  if she tolerates it.  Lexa mammo   ok, due for R mammo .    DEXA  normal, repeat .    #Hot flashes: Discussed options of discontinuing endocrine therapy versus switching to a different agent.  She wishes to hold off.  Will hold medication for 2 weeks and see if there is improvement and then  restart.  If she continues to experience hot flashes, will change AI.    # Quality measures: Hypertension target range 130-140/70-80 if BP persistently high, should discuss with PCP.    Survivorship issues were addressed with the patient.  Patient-reported issues included hot flashes .  Recommended interventions as follows:  Hold medicine and restart..     ORDERS PLACED:    Return in about 6 months (around 1/16/2026) for MD visit.    Марина Ricks M.D.    [facility]    [facility]  [address] [place], [address]    7/16/2025           [1]   Allergies  Allergen Reactions    Bactrim [Sulfamethoxazole W/Trimethoprim] OTHER (SEE COMMENTS)     General hives and swelling of throat    Erythromycin HIVES    Codeine OTHER (SEE COMMENTS)     Drowsy/respiratory rate/O2 levels decrease

## 2025-07-16 ENCOUNTER — OFFICE VISIT (OUTPATIENT)
Age: 70
End: 2025-07-16
Attending: NURSE PRACTITIONER
Payer: MEDICARE

## 2025-07-16 VITALS
BODY MASS INDEX: 43 KG/M2 | TEMPERATURE: 97 F | DIASTOLIC BLOOD PRESSURE: 80 MMHG | SYSTOLIC BLOOD PRESSURE: 148 MMHG | HEART RATE: 84 BPM | WEIGHT: 272 LBS | OXYGEN SATURATION: 93 % | RESPIRATION RATE: 18 BRPM

## 2025-07-16 DIAGNOSIS — D05.11 DUCTAL CARCINOMA IN SITU (DCIS) OF RIGHT BREAST: Primary | ICD-10-CM

## 2025-07-16 DIAGNOSIS — T50.905D ADVERSE EFFECT OF DRUG, SUBSEQUENT ENCOUNTER: ICD-10-CM

## 2025-07-16 NOTE — PROGRESS NOTES
Education Record    Learner:  Patient/ spouse    Disease / Diagnosis:right DCIS   4 month FU     Barriers / Limitations:  None   Comments:    Method:  Discussion   Comments:    General Topics:  Plan of care reviewed   Comments:    Outcome:  Shows understanding   Comments:   Breast imaging up to date.   Taking letrozole.   Reports having night sweats every night, will have to change her clothes sometimes. Has not lessened with time   Denies joint pain.

## 2025-08-01 DIAGNOSIS — D05.11 DUCTAL CARCINOMA IN SITU (DCIS) OF RIGHT BREAST: Primary | ICD-10-CM

## 2025-08-04 RX ORDER — LETROZOLE 2.5 MG/1
2.5 TABLET, FILM COATED ORAL DAILY
Qty: 90 TABLET | Refills: 1 | Status: SHIPPED | OUTPATIENT
Start: 2025-08-04

## (undated) DEVICE — GLOVE SUR 6.5 SENSICARE PI PIP CRM PWD F

## (undated) DEVICE — ANTIBACTERIAL UNDYED BRAIDED (POLYGLACTIN 910), SYNTHETIC ABSORBABLE SUTURE: Brand: COATED VICRYL

## (undated) DEVICE — TOWEL: OR BLU 80/CS: Brand: MEDICAL ACTION INDUSTRIES

## (undated) DEVICE — CLIP LIG M BLU TI HRT SHP WIRE HORZ

## (undated) DEVICE — SOL  .9 1000ML BTL

## (undated) DEVICE — #15 STERILE STAINLESS BLADE: Brand: STERILE STAINLESS BLADES

## (undated) DEVICE — THROAT PACKING X-RAY DETECT

## (undated) DEVICE — BUR DENTAL 703

## (undated) DEVICE — SOLUTION IRRIG 1000ML 0.9% NACL USP BTL

## (undated) DEVICE — SUTURE CHROMIC GUT 3-0 FS-2

## (undated) DEVICE — SPONGE RAYTEC 4X4 RF DETECT

## (undated) DEVICE — COVER,MAYO STAND,STERILE: Brand: MEDLINE

## (undated) DEVICE — HANDLE LIGHT ECONOMY

## (undated) DEVICE — DRAPE,TAPE STRIPS,STERILE: Brand: MEDLINE

## (undated) DEVICE — UNDERPAD INCONT 23X36IN STD BLU BACKSHEET

## (undated) DEVICE — 3M™ STERI-DRAPE™ INSTRUMENT POUCH 1018: Brand: STERI-DRAPE™

## (undated) DEVICE — ELECTRODE ES 2.75IN PTFE BLDE MOD E-Z CLN

## (undated) DEVICE — KENDALL SCD EXPRESS SLEEVES, KNEE LENGTH, MEDIUM: Brand: KENDALL SCD

## (undated) DEVICE — BREAST-HERNIA-PORT CDS-LF: Brand: MEDLINE INDUSTRIES, INC.

## (undated) DEVICE — SOL H2O 1000ML BTL

## (undated) DEVICE — HOVERMATT 34IN SINGLE USE

## (undated) DEVICE — SUT PERMA- 2-0 18IN FS NABSRB BLK 26MM 3/8

## (undated) DEVICE — DRAPE TABLE COVER 44X90 TC-10

## (undated) DEVICE — SLEEVE COMPR MD KNEE LEN SGL USE KENDALL SCD

## (undated) DEVICE — GLOVE BIOGEL M SURG SZ 8-1/2

## (undated) DEVICE — SUT MCRYL 4-0 18IN PS-2 ABSRB UD 19MM 3/8 CIR

## (undated) DEVICE — COVER,LIGHT,CAMERA,HARD,1/PK,STRL: Brand: MEDLINE

## (undated) DEVICE — MEDI-VAC NON-CONDUCTIVE SUCTION TUBING: Brand: CARDINAL HEALTH

## (undated) DEVICE — PAD,ABDOMINAL,8"X7.5",ST,LF,20/BX: Brand: MEDLINE INDUSTRIES, INC.

## (undated) DEVICE — GAUZE SPONGES,12 PLY: Brand: CURITY

## (undated) DEVICE — DRAPE PACK CHEST

## (undated) DEVICE — Device

## (undated) DEVICE — 1200CC GUARDIAN II: Brand: GUARDIAN

## (undated) DEVICE — MEDI-VAC SUCTION HANDLE REGULAR CAPACITY: Brand: CARDINAL HEALTH

## (undated) DEVICE — SUT ETHLN 3-0 18IN PS-2 NABSRB BLK 19MM 3/8 C

## (undated) DEVICE — 1910 FOAM BLOCK NEEDLE COUNTER: Brand: DEVON

## (undated) DEVICE — DRAPE HALF 40X58 DYNJP2410

## (undated) DEVICE — CLIP SUR SM TI HRT SHP WIRE HORZ LIG SYS

## (undated) DEVICE — GOWN,SIRUS,FABRIC-REINFORCED,LARGE: Brand: MEDLINE

## (undated) NOTE — LETTER
To:  Dr. Rui Abel  Date:  10/9/2024  Patient Name: Joanie Ramos-Age / Sex: 1955-A: 69 y  female  Medical Records: [id number]   CSN: [id number]      Clearance for Surgery Requested by Surgeon    Request for:  Medical Clearance    Requested by Surgeon: Dr. Luanne Barnett    Surgical Date: 10/18/2024    Procedure: Right breast wire localized lumpectomy, Right      Please fax the clearance note to the Pre-Admission Testing department.  Thank you.

## (undated) NOTE — LETTER
OUTSIDE TESTING RESULT REQUEST     IMPORTANT: FOR YOUR IMMEDIATE ATTENTION  Please FAX all test results listed below to: [phone]     Testing already done on or about: ***     * * * * If testing is NOT complete, arrange with patient A.S.A.P. * * * *      Patient Name: Joanie Ramos  Surgery Date: 10/18/2024  Medical Record: [id number]  CSN: [id number]  : 1955 - A: 69 y     Sex: female  Surgeon(s):  Luanne Barnett MD  Procedure: Right breast wire localized lumpectomy  Anesthesia Type: MAC     Surgeon: Luanne Barnett MD     The following Testing and Time Line are REQUIRED PER ANESTHESIA     {EDW PAT SENDOUT:7064}      Thank You,   Sent by:***

## (undated) NOTE — LETTER
OUTSIDE TESTING RESULT REQUEST     IMPORTANT: FOR YOUR IMMEDIATE ATTENTION  Please FAX all test results listed below to: [phone]     Testing already done on or about: 10/10/24     * * * * If testing is NOT complete, arrange with patient A.S.A.P. * * * *      Patient Name: Joanie Ramos  Surgery Date: 10/18/2024  Medical Record: [id number]  CSN: [id number]  : 1955 - A: 69 y     Sex: female  Surgeon(s):  Luanne Barnett MD  Procedure: Right breast wire localized lumpectomy  Anesthesia Type: MAC     Surgeon: Luanne Barnett MD     The following Testing and Time Line are REQUIRED PER ANESTHESIA     EKG READ AND SIGNED WITHIN   90 days  BMP (requires 4 hour fast) within  90 days      Thank You,   Sent by: Makayla Hodgson RN

## (undated) NOTE — Clinical Note
On letrozole 6 weeks, doing well with tolerable hot flashes only. Discussed weight loss, med fitness, will think about it. Sees you on 2/25, mammog 5/5. Nice lady, thx

## (undated) NOTE — Clinical Note
OUTSIDE TESTING RESULT REQUEST     IMPORTANT: FOR YOUR IMMEDIATE ATTENTION  Please FAX all test results listed below to: [phone]     Testing already done on or about: ***     * * * * If testing is NOT complete, arrange with patient A.S.A.P. * * * *      Patient Name: Joanie Ramos  Surgery Date: 10/18/2024  Medical Record: [id number]  CSN: [id number]  : 1955 - A: 69 y     Sex: female  Surgeon(s):  Luanne Barnett MD  Procedure: Right breast wire localized lumpectomy  Anesthesia Type: MAC     Surgeon: Luanne Barnett MD     The following Testing and Time Line are REQUIRED PER ANESTHESIA     {EDW PAT SENDOUT:3764}      Thank You,   Sent by:***